# Patient Record
Sex: MALE | Race: WHITE
[De-identification: names, ages, dates, MRNs, and addresses within clinical notes are randomized per-mention and may not be internally consistent; named-entity substitution may affect disease eponyms.]

---

## 2019-07-14 ENCOUNTER — HOSPITAL ENCOUNTER (INPATIENT)
Dept: HOSPITAL 54 - ER | Age: 64
LOS: 11 days | Discharge: HOME | DRG: 885 | End: 2019-07-25
Attending: NURSE PRACTITIONER | Admitting: PSYCHIATRY & NEUROLOGY
Payer: MEDICAID

## 2019-07-14 VITALS — DIASTOLIC BLOOD PRESSURE: 71 MMHG | SYSTOLIC BLOOD PRESSURE: 121 MMHG

## 2019-07-14 VITALS — BODY MASS INDEX: 22.54 KG/M2 | WEIGHT: 161 LBS | HEIGHT: 71 IN

## 2019-07-14 VITALS — SYSTOLIC BLOOD PRESSURE: 121 MMHG | DIASTOLIC BLOOD PRESSURE: 71 MMHG

## 2019-07-14 DIAGNOSIS — E86.1: ICD-10-CM

## 2019-07-14 DIAGNOSIS — E03.9: ICD-10-CM

## 2019-07-14 DIAGNOSIS — F70: ICD-10-CM

## 2019-07-14 DIAGNOSIS — R62.50: ICD-10-CM

## 2019-07-14 DIAGNOSIS — H40.9: ICD-10-CM

## 2019-07-14 DIAGNOSIS — F29: Primary | ICD-10-CM

## 2019-07-14 DIAGNOSIS — F01.50: ICD-10-CM

## 2019-07-14 DIAGNOSIS — G93.41: ICD-10-CM

## 2019-07-14 DIAGNOSIS — D72.819: ICD-10-CM

## 2019-07-14 DIAGNOSIS — G40.909: ICD-10-CM

## 2019-07-14 DIAGNOSIS — E87.1: ICD-10-CM

## 2019-07-14 LAB
ALBUMIN SERPL BCP-MCNC: 3.8 G/DL (ref 3.4–5)
ALP SERPL-CCNC: 61 U/L (ref 46–116)
ALT SERPL W P-5'-P-CCNC: 26 U/L (ref 12–78)
APAP SERPL-MCNC: 0 UG/ML (ref 10–30)
AST SERPL W P-5'-P-CCNC: 17 U/L (ref 15–37)
BASOPHILS # BLD AUTO: 0 /CMM (ref 0–0.2)
BASOPHILS NFR BLD AUTO: 0.9 % (ref 0–2)
BILIRUB DIRECT SERPL-MCNC: 0.2 MG/DL (ref 0–0.2)
BILIRUB SERPL-MCNC: 0.6 MG/DL (ref 0.2–1)
BUN SERPL-MCNC: 15 MG/DL (ref 7–18)
CALCIUM SERPL-MCNC: 8.7 MG/DL (ref 8.5–10.1)
CHLORIDE SERPL-SCNC: 98 MMOL/L (ref 98–107)
CO2 SERPL-SCNC: 29 MMOL/L (ref 21–32)
CREAT SERPL-MCNC: 0.8 MG/DL (ref 0.6–1.3)
EOSINOPHIL NFR BLD AUTO: 3.5 % (ref 0–6)
ETHANOL SERPL-MCNC: < 3 MG/DL (ref 0–0)
GLUCOSE SERPL-MCNC: 88 MG/DL (ref 74–106)
HCT VFR BLD AUTO: 35 % (ref 39–51)
HGB BLD-MCNC: 12 G/DL (ref 13.5–17.5)
LYMPHOCYTES NFR BLD AUTO: 1.2 /CMM (ref 0.8–4.8)
LYMPHOCYTES NFR BLD AUTO: 37.9 % (ref 20–44)
MCHC RBC AUTO-ENTMCNC: 35 G/DL (ref 31–36)
MCV RBC AUTO: 96 FL (ref 80–96)
MONOCYTES NFR BLD AUTO: 0.5 /CMM (ref 0.1–1.3)
MONOCYTES NFR BLD AUTO: 15.1 % (ref 2–12)
NEUTROPHILS # BLD AUTO: 1.4 /CMM (ref 1.8–8.9)
NEUTROPHILS NFR BLD AUTO: 42.6 % (ref 43–81)
PH UR STRIP: 6 [PH] (ref 5–8)
PLATELET # BLD AUTO: 149 /CMM (ref 150–450)
POTASSIUM SERPL-SCNC: 3.9 MMOL/L (ref 3.5–5.1)
PROT SERPL-MCNC: 6.3 G/DL (ref 6.4–8.2)
RBC # BLD AUTO: 3.61 MIL/UL (ref 4.5–6)
SALICYLATES SERPL-MCNC: 2.4 MG/DL (ref 2.8–20)
SODIUM SERPL-SCNC: 132 MMOL/L (ref 136–145)
UROBILINOGEN UR STRIP-MCNC: 1 EU/DL
WBC NRBC COR # BLD AUTO: 3.3 K/UL (ref 4.3–11)

## 2019-07-14 PROCEDURE — G0480 DRUG TEST DEF 1-7 CLASSES: HCPCS

## 2019-07-14 RX ADMIN — LEVETIRACETAM SCH MG: 100 SOLUTION ORAL at 21:47

## 2019-07-14 RX ADMIN — VALPROIC ACID SCH MG: 250 SOLUTION ORAL at 21:53

## 2019-07-14 RX ADMIN — TEMAZEPAM PRN MG: 7.5 CAPSULE ORAL at 21:48

## 2019-07-14 NOTE — NUR
GPS RN ADMISSION NOTES



ADMITTED A 62 Y/O MALE FROM [facility] AND CARE  ON 5150 HOLD FOR PSYCHOSIS. PATIENT 
ARRIVED TO THE UNIT AT 1340 VIA WHEELCHAIR ACCOMPANIED BY 2 E.R. STAFF. PATIENT IS A/O X2-3. 
PATIENT APPARENTLY SENT TO HOSPITAL DUE TO DISORGANIZE, BIZARRE, ERRATIC BEHAVIOR, NON 
COMPLIANT WITH CARE AND MEDS. VITAL SIGNS TAKEN ,STABLE AND RECORDED. UPON FACE TO FACE 
INTERVIEW,  PT IS VERBALLY RESPONSIVE WITH GARBLE SPEECH, DENIES HI/SI. SAD, WELL GROOMED, 
UN-COOPERATIVE WITH DISORGANIZE THOUGHTS. NO C/O PAIN OR ANY DISCOMFORTS VOICED AT THIS 
TIME, SAME NO APPARENT DISTRESS NOTED. PHYSICAL ASSESSMENT DONE: SKIN IS INTACT. LUNGS CLEAR 
ON AUSCULTATION BILATERALLY. ABDOMEN SOFT, NON-TENDER WITH POSITIVE BOWEL SOUNDS ON FOUR 
QUADRANTS. BELONGINGS INVENTORIED AND CHECKED FOR CONTRABAND. PATIENT REFUSED/UN-ABLE TO 
SIGN FORM. PATIENT UNDER PSYCHIATRIC CARE OF DR. CHAVIRA AND MEDICAL CARE NP SHEKHAR. PATIENT 
HANDBOOK AND MEDICATION GUIDE GIVEN. PATIENT WISHES FULL CODE. SAFETY MEASURES INITIATED. 
BED PLACED IN LOWEST LOCK POSITION. WILL CONTINUE. TO MONITOR Q15 MINS FOR SAFETY AND 
BEHAVIOR

## 2019-07-14 NOTE — NUR
RECEIVED ORDER FOR ZYPREXA 5 MG PO. PT UNABLE TO TAKE MEDICATION ORAALLY. MD 
NOTIFIED AND ORDER TO GIVE MEDICATION IM INSTEAD OF PO

## 2019-07-15 VITALS — SYSTOLIC BLOOD PRESSURE: 144 MMHG | DIASTOLIC BLOOD PRESSURE: 69 MMHG

## 2019-07-15 VITALS — DIASTOLIC BLOOD PRESSURE: 62 MMHG | SYSTOLIC BLOOD PRESSURE: 111 MMHG

## 2019-07-15 VITALS — SYSTOLIC BLOOD PRESSURE: 129 MMHG | DIASTOLIC BLOOD PRESSURE: 52 MMHG

## 2019-07-15 LAB
ALBUMIN SERPL BCP-MCNC: 3.8 G/DL (ref 3.4–5)
ALP SERPL-CCNC: 59 U/L (ref 46–116)
ALT SERPL W P-5'-P-CCNC: 27 U/L (ref 12–78)
AST SERPL W P-5'-P-CCNC: 17 U/L (ref 15–37)
BILIRUB SERPL-MCNC: 0.5 MG/DL (ref 0.2–1)
BUN SERPL-MCNC: 15 MG/DL (ref 7–18)
CALCIUM SERPL-MCNC: 9 MG/DL (ref 8.5–10.1)
CHLORIDE SERPL-SCNC: 104 MMOL/L (ref 98–107)
CHOLEST SERPL-MCNC: 142 MG/DL (ref ?–200)
CO2 SERPL-SCNC: 28 MMOL/L (ref 21–32)
CREAT SERPL-MCNC: 0.9 MG/DL (ref 0.6–1.3)
GLUCOSE SERPL-MCNC: 79 MG/DL (ref 74–106)
HDLC SERPL-MCNC: 73 MG/DL (ref 40–60)
LDLC SERPL DIRECT ASSAY-MCNC: 53 MG/DL (ref 0–99)
POTASSIUM SERPL-SCNC: 4.3 MMOL/L (ref 3.5–5.1)
PROT SERPL-MCNC: 6.4 G/DL (ref 6.4–8.2)
SODIUM SERPL-SCNC: 140 MMOL/L (ref 136–145)
TRIGL SERPL-MCNC: 68 MG/DL (ref 30–150)
VALPROATE SERPL-MCNC: 56 UG/ML (ref 50–100)

## 2019-07-15 RX ADMIN — LACOSAMIDE SCH MG: 50 TABLET, FILM COATED ORAL at 21:46

## 2019-07-15 RX ADMIN — LEVETIRACETAM SCH MG: 100 SOLUTION ORAL at 09:04

## 2019-07-15 RX ADMIN — LORAZEPAM PRN MG: 0.5 TABLET ORAL at 09:04

## 2019-07-15 RX ADMIN — LEVOTHYROXINE SODIUM SCH MCG: 50 TABLET ORAL at 09:04

## 2019-07-15 RX ADMIN — VITAMIN D, TAB 1000IU (100/BT) SCH UNIT: 25 TAB at 09:04

## 2019-07-15 RX ADMIN — CARBAMAZEPINE SCH MG: 200 TABLET ORAL at 17:07

## 2019-07-15 RX ADMIN — LEVETIRACETAM SCH MG: 100 SOLUTION ORAL at 21:40

## 2019-07-15 RX ADMIN — LACOSAMIDE SCH MG: 50 TABLET, FILM COATED ORAL at 10:42

## 2019-07-15 RX ADMIN — TEMAZEPAM PRN MG: 7.5 CAPSULE ORAL at 01:33

## 2019-07-15 RX ADMIN — VALPROIC ACID SCH MG: 250 SOLUTION ORAL at 21:41

## 2019-07-15 RX ADMIN — CARBAMAZEPINE SCH MG: 200 TABLET ORAL at 09:06

## 2019-07-15 RX ADMIN — TEMAZEPAM PRN MG: 7.5 CAPSULE ORAL at 21:42

## 2019-07-15 NOTE — NUR
TEZ called [facility] and spoke to Dennis ([phone]) who stated that if the pt is 
stable then he can return to the facility. He stated that he would come and assess the pt 
closer to discharge.

## 2019-07-15 NOTE — NUR
TEZ called the pts brother, Dony ([phone]), and left a voicemail stating that the SW 
would like to speak to him about the pts treatment plan and discharge plan.

## 2019-07-16 VITALS — DIASTOLIC BLOOD PRESSURE: 75 MMHG | SYSTOLIC BLOOD PRESSURE: 162 MMHG

## 2019-07-16 VITALS — SYSTOLIC BLOOD PRESSURE: 119 MMHG | DIASTOLIC BLOOD PRESSURE: 76 MMHG

## 2019-07-16 VITALS — SYSTOLIC BLOOD PRESSURE: 161 MMHG | DIASTOLIC BLOOD PRESSURE: 103 MMHG

## 2019-07-16 RX ADMIN — LEVOTHYROXINE SODIUM SCH MCG: 50 TABLET ORAL at 08:33

## 2019-07-16 RX ADMIN — VALPROIC ACID SCH MG: 250 SOLUTION ORAL at 21:54

## 2019-07-16 RX ADMIN — LACOSAMIDE SCH MG: 50 TABLET, FILM COATED ORAL at 21:55

## 2019-07-16 RX ADMIN — CARBAMAZEPINE SCH MG: 200 TABLET ORAL at 08:33

## 2019-07-16 RX ADMIN — CARBAMAZEPINE SCH MG: 200 TABLET ORAL at 16:28

## 2019-07-16 RX ADMIN — LATANOPROST SCH ML: 50 SOLUTION OPHTHALMIC at 21:55

## 2019-07-16 RX ADMIN — VITAMIN D, TAB 1000IU (100/BT) SCH UNIT: 25 TAB at 08:33

## 2019-07-16 RX ADMIN — LEVETIRACETAM SCH MG: 100 SOLUTION ORAL at 09:00

## 2019-07-16 RX ADMIN — LORAZEPAM PRN MG: 0.5 TABLET ORAL at 21:54

## 2019-07-16 RX ADMIN — LEVETIRACETAM SCH MG: 100 SOLUTION ORAL at 21:54

## 2019-07-16 RX ADMIN — LACOSAMIDE SCH MG: 50 TABLET, FILM COATED ORAL at 08:34

## 2019-07-16 RX ADMIN — TEMAZEPAM PRN MG: 7.5 CAPSULE ORAL at 21:54

## 2019-07-16 NOTE — NUR
Dony ([phone]), pts brother, called the SW back and the SW conducted the remaining 
parts of the psychosocial assessment with him due to the pts inability to answer questions 
because of his disability. SW also discussed the pts treatment plan and initial discharge 
plan with the pt.

## 2019-07-16 NOTE — NUR
Initial Discharge Plan: Pt currently resides at [facility] located at [address] [place] [address]; ([phone]). Per pt's brother, Dony 
([phone]), the pt will have to be placed in a facility. SW will work with the pt, the 
pts brother, and the MD regarding appropriate discharge planning. SW will form a safe and 
proper discharge.

## 2019-07-17 VITALS — DIASTOLIC BLOOD PRESSURE: 79 MMHG | SYSTOLIC BLOOD PRESSURE: 118 MMHG

## 2019-07-17 VITALS — DIASTOLIC BLOOD PRESSURE: 81 MMHG | SYSTOLIC BLOOD PRESSURE: 120 MMHG

## 2019-07-17 VITALS — DIASTOLIC BLOOD PRESSURE: 67 MMHG | SYSTOLIC BLOOD PRESSURE: 112 MMHG

## 2019-07-17 LAB
BASOPHILS # BLD AUTO: 0 /CMM (ref 0–0.2)
BASOPHILS NFR BLD AUTO: 0.7 % (ref 0–2)
EOSINOPHIL NFR BLD AUTO: 6 % (ref 0–6)
HCT VFR BLD AUTO: 37 % (ref 39–51)
HGB BLD-MCNC: 12.9 G/DL (ref 13.5–17.5)
LYMPHOCYTES NFR BLD AUTO: 1.7 /CMM (ref 0.8–4.8)
LYMPHOCYTES NFR BLD AUTO: 38.2 % (ref 20–44)
MCHC RBC AUTO-ENTMCNC: 35 G/DL (ref 31–36)
MCV RBC AUTO: 96 FL (ref 80–96)
MONOCYTES NFR BLD AUTO: 0.5 /CMM (ref 0.1–1.3)
MONOCYTES NFR BLD AUTO: 10.8 % (ref 2–12)
NEUTROPHILS # BLD AUTO: 1.9 /CMM (ref 1.8–8.9)
NEUTROPHILS NFR BLD AUTO: 44.3 % (ref 43–81)
PLATELET # BLD AUTO: 150 /CMM (ref 150–450)
RBC # BLD AUTO: 3.86 MIL/UL (ref 4.5–6)
WBC NRBC COR # BLD AUTO: 4.4 K/UL (ref 4.3–11)

## 2019-07-17 RX ADMIN — VALPROIC ACID SCH MG: 250 SOLUTION ORAL at 21:32

## 2019-07-17 RX ADMIN — CARBAMAZEPINE SCH MG: 200 TABLET ORAL at 17:00

## 2019-07-17 RX ADMIN — LEVOTHYROXINE SODIUM SCH MCG: 50 TABLET ORAL at 07:30

## 2019-07-17 RX ADMIN — LACOSAMIDE SCH MG: 50 TABLET, FILM COATED ORAL at 21:31

## 2019-07-17 RX ADMIN — VITAMIN D, TAB 1000IU (100/BT) SCH UNIT: 25 TAB at 08:38

## 2019-07-17 RX ADMIN — LACOSAMIDE SCH MG: 50 TABLET, FILM COATED ORAL at 08:38

## 2019-07-17 RX ADMIN — LEVETIRACETAM SCH MG: 100 SOLUTION ORAL at 21:30

## 2019-07-17 RX ADMIN — CARBAMAZEPINE SCH MG: 200 TABLET ORAL at 08:37

## 2019-07-17 RX ADMIN — LATANOPROST SCH ML: 50 SOLUTION OPHTHALMIC at 21:32

## 2019-07-17 RX ADMIN — LEVETIRACETAM SCH MG: 100 SOLUTION ORAL at 08:37

## 2019-07-17 NOTE — NUR
GPS RN NOTES

RECEIVED ON BED SLEEPING,AROUSABLE TO VERBAL STIMULI,APPEARS ISOLATED.AMBULATE WITH STEADY 
GAIT.DENIES SI,WILL CONTINUE TO MONITOR BEHAVIOR

## 2019-07-17 NOTE — NUR
TEZ called Yanci ([phone]) from [facility] and inquired if the pt can be 
readmitted and she stated that they are at full capacity right now and therefore the pt 
cannot be accepted.

## 2019-07-18 VITALS — SYSTOLIC BLOOD PRESSURE: 126 MMHG | DIASTOLIC BLOOD PRESSURE: 72 MMHG

## 2019-07-18 VITALS — SYSTOLIC BLOOD PRESSURE: 119 MMHG | DIASTOLIC BLOOD PRESSURE: 77 MMHG

## 2019-07-18 VITALS — DIASTOLIC BLOOD PRESSURE: 66 MMHG | SYSTOLIC BLOOD PRESSURE: 120 MMHG

## 2019-07-18 RX ADMIN — LEVETIRACETAM SCH MG: 100 SOLUTION ORAL at 21:50

## 2019-07-18 RX ADMIN — LACOSAMIDE SCH MG: 50 TABLET, FILM COATED ORAL at 08:45

## 2019-07-18 RX ADMIN — LATANOPROST SCH ML: 50 SOLUTION OPHTHALMIC at 21:50

## 2019-07-18 RX ADMIN — VITAMIN D, TAB 1000IU (100/BT) SCH UNIT: 25 TAB at 08:45

## 2019-07-18 RX ADMIN — LEVOTHYROXINE SODIUM SCH MCG: 50 TABLET ORAL at 08:44

## 2019-07-18 RX ADMIN — VALPROIC ACID SCH MG: 250 SOLUTION ORAL at 21:51

## 2019-07-18 RX ADMIN — LACOSAMIDE SCH MG: 50 TABLET, FILM COATED ORAL at 21:50

## 2019-07-18 RX ADMIN — TEMAZEPAM PRN MG: 7.5 CAPSULE ORAL at 21:50

## 2019-07-18 RX ADMIN — LEVETIRACETAM SCH MG: 100 SOLUTION ORAL at 08:45

## 2019-07-18 RX ADMIN — CARBAMAZEPINE SCH MG: 200 TABLET ORAL at 08:47

## 2019-07-18 RX ADMIN — CARBAMAZEPINE SCH MG: 200 TABLET ORAL at 17:32

## 2019-07-18 NOTE — NUR
PC Hearing Notification: SW called the pts brother, Dony ([phone]), and left a 
voicemail stating that the pt will have a hearing today and informed him about what the 
hearing entails and the possible outcomes.

## 2019-07-18 NOTE — NUR
RN GPS NOTES



RECEIVED PATIENT ASLEEP IN BED ABLE TO AROUSE WITH VOICE AND TOUCH NO S/S OF DISTRESS OR 
ACUTE PAIN NOTED. PATIENT IS A/0 X1-2 .PATIENT DENIES ANY SUICIDE IDEATIONS OR HOMICIDAL 
IDEATIONS AT THIS TIME. PATIENT HAS NO NEEDS AT THIS TIME . SAFETY PRECAUTIONS IN PLACE BED 
IN LOW POSITION SIDE RAILS UP X2 FOR SAFETY, BED IN LOW LOCKED POSITION.COMPLIANT WITH 
MEDICATION  CALL  BELL WITHIN REACH WILL CONTINUE TO MONITOR ACCORDINGLY

## 2019-07-19 VITALS — SYSTOLIC BLOOD PRESSURE: 121 MMHG | DIASTOLIC BLOOD PRESSURE: 78 MMHG

## 2019-07-19 VITALS — SYSTOLIC BLOOD PRESSURE: 133 MMHG | DIASTOLIC BLOOD PRESSURE: 74 MMHG

## 2019-07-19 VITALS — SYSTOLIC BLOOD PRESSURE: 130 MMHG | DIASTOLIC BLOOD PRESSURE: 79 MMHG

## 2019-07-19 RX ADMIN — LACOSAMIDE SCH MG: 50 TABLET, FILM COATED ORAL at 08:43

## 2019-07-19 RX ADMIN — LEVOTHYROXINE SODIUM SCH MCG: 50 TABLET ORAL at 08:44

## 2019-07-19 RX ADMIN — LEVETIRACETAM SCH MG: 100 SOLUTION ORAL at 08:43

## 2019-07-19 RX ADMIN — LATANOPROST SCH ML: 50 SOLUTION OPHTHALMIC at 20:53

## 2019-07-19 RX ADMIN — VALPROIC ACID SCH MG: 250 SOLUTION ORAL at 20:53

## 2019-07-19 RX ADMIN — CARBAMAZEPINE SCH MG: 200 TABLET ORAL at 08:49

## 2019-07-19 RX ADMIN — VITAMIN D, TAB 1000IU (100/BT) SCH UNIT: 25 TAB at 08:43

## 2019-07-19 RX ADMIN — LACOSAMIDE SCH MG: 50 TABLET, FILM COATED ORAL at 20:52

## 2019-07-19 RX ADMIN — CARBAMAZEPINE SCH MG: 200 TABLET ORAL at 17:57

## 2019-07-19 RX ADMIN — LEVETIRACETAM SCH MG: 100 SOLUTION ORAL at 20:53

## 2019-07-19 RX ADMIN — TEMAZEPAM PRN MG: 7.5 CAPSULE ORAL at 20:53

## 2019-07-19 NOTE — NUR
TEZ called [facility] and spoke to Dennis ([phone]) and informed him that the pt 
will be discharged on Monday. He stated that he would like the pts transportation to be 
provided by the hospital and the SW stated that she will inform him about that on Monday.

## 2019-07-20 VITALS — SYSTOLIC BLOOD PRESSURE: 122 MMHG | DIASTOLIC BLOOD PRESSURE: 67 MMHG

## 2019-07-20 VITALS — SYSTOLIC BLOOD PRESSURE: 119 MMHG | DIASTOLIC BLOOD PRESSURE: 64 MMHG

## 2019-07-20 VITALS — DIASTOLIC BLOOD PRESSURE: 73 MMHG | SYSTOLIC BLOOD PRESSURE: 103 MMHG

## 2019-07-20 LAB
BASOPHILS # BLD AUTO: 0 /CMM (ref 0–0.2)
BASOPHILS NFR BLD AUTO: 0.7 % (ref 0–2)
BUN SERPL-MCNC: 18 MG/DL (ref 7–18)
CALCIUM SERPL-MCNC: 9 MG/DL (ref 8.5–10.1)
CHLORIDE SERPL-SCNC: 100 MMOL/L (ref 98–107)
CO2 SERPL-SCNC: 30 MMOL/L (ref 21–32)
CREAT SERPL-MCNC: 0.8 MG/DL (ref 0.6–1.3)
EOSINOPHIL NFR BLD AUTO: 6 % (ref 0–6)
GLUCOSE SERPL-MCNC: 81 MG/DL (ref 74–106)
HCT VFR BLD AUTO: 39 % (ref 39–51)
HGB BLD-MCNC: 13.3 G/DL (ref 13.5–17.5)
LYMPHOCYTES NFR BLD AUTO: 1.8 /CMM (ref 0.8–4.8)
LYMPHOCYTES NFR BLD AUTO: 42.4 % (ref 20–44)
MCHC RBC AUTO-ENTMCNC: 35 G/DL (ref 31–36)
MCV RBC AUTO: 96 FL (ref 80–96)
MONOCYTES NFR BLD AUTO: 0.5 /CMM (ref 0.1–1.3)
MONOCYTES NFR BLD AUTO: 10.7 % (ref 2–12)
NEUTROPHILS # BLD AUTO: 1.7 /CMM (ref 1.8–8.9)
NEUTROPHILS NFR BLD AUTO: 40.2 % (ref 43–81)
PLATELET # BLD AUTO: 146 /CMM (ref 150–450)
POTASSIUM SERPL-SCNC: 4.3 MMOL/L (ref 3.5–5.1)
RBC # BLD AUTO: 4.03 MIL/UL (ref 4.5–6)
SODIUM SERPL-SCNC: 138 MMOL/L (ref 136–145)
WBC NRBC COR # BLD AUTO: 4.3 K/UL (ref 4.3–11)

## 2019-07-20 RX ADMIN — VALPROIC ACID SCH MG: 250 SOLUTION ORAL at 21:22

## 2019-07-20 RX ADMIN — LACOSAMIDE SCH MG: 50 TABLET, FILM COATED ORAL at 21:21

## 2019-07-20 RX ADMIN — LEVOTHYROXINE SODIUM SCH MCG: 50 TABLET ORAL at 09:06

## 2019-07-20 RX ADMIN — LACOSAMIDE SCH MG: 50 TABLET, FILM COATED ORAL at 09:06

## 2019-07-20 RX ADMIN — CARBAMAZEPINE SCH MG: 200 TABLET ORAL at 09:07

## 2019-07-20 RX ADMIN — LEVETIRACETAM SCH MG: 100 SOLUTION ORAL at 21:22

## 2019-07-20 RX ADMIN — LATANOPROST SCH ML: 50 SOLUTION OPHTHALMIC at 21:22

## 2019-07-20 RX ADMIN — VITAMIN D, TAB 1000IU (100/BT) SCH UNIT: 25 TAB at 10:51

## 2019-07-20 RX ADMIN — TEMAZEPAM PRN MG: 7.5 CAPSULE ORAL at 21:21

## 2019-07-20 RX ADMIN — CARBAMAZEPINE SCH MG: 200 TABLET ORAL at 16:20

## 2019-07-20 RX ADMIN — LEVETIRACETAM SCH MG: 100 SOLUTION ORAL at 09:06

## 2019-07-21 VITALS — DIASTOLIC BLOOD PRESSURE: 73 MMHG | SYSTOLIC BLOOD PRESSURE: 118 MMHG

## 2019-07-21 VITALS — SYSTOLIC BLOOD PRESSURE: 112 MMHG | DIASTOLIC BLOOD PRESSURE: 67 MMHG

## 2019-07-21 VITALS — SYSTOLIC BLOOD PRESSURE: 99 MMHG | DIASTOLIC BLOOD PRESSURE: 64 MMHG

## 2019-07-21 RX ADMIN — VALPROIC ACID SCH MG: 250 SOLUTION ORAL at 21:38

## 2019-07-21 RX ADMIN — VITAMIN D, TAB 1000IU (100/BT) SCH UNIT: 25 TAB at 08:21

## 2019-07-21 RX ADMIN — CARBAMAZEPINE SCH MG: 200 TABLET ORAL at 08:22

## 2019-07-21 RX ADMIN — LACOSAMIDE SCH MG: 50 TABLET, FILM COATED ORAL at 08:21

## 2019-07-21 RX ADMIN — LATANOPROST SCH ML: 50 SOLUTION OPHTHALMIC at 21:39

## 2019-07-21 RX ADMIN — LACOSAMIDE SCH MG: 50 TABLET, FILM COATED ORAL at 21:39

## 2019-07-21 RX ADMIN — CARBAMAZEPINE SCH MG: 200 TABLET ORAL at 16:39

## 2019-07-21 RX ADMIN — LEVETIRACETAM SCH MG: 100 SOLUTION ORAL at 08:22

## 2019-07-21 RX ADMIN — LEVOTHYROXINE SODIUM SCH MCG: 50 TABLET ORAL at 08:21

## 2019-07-21 RX ADMIN — LEVETIRACETAM SCH MG: 100 SOLUTION ORAL at 21:37

## 2019-07-21 NOTE — NUR
GPS RN NOTE, RECEIVED PATIENT AWAKE AND IN BED, NO S/S OR COMPLAINTS OF PAIN AT THIS TIME.  
PATIENT IS DISPLAYING NO S/S OF APPARENT DISTRESS AT THIS TIME.  PATIENT BREATHING IS 
UNLABORED WITH EQUAL RISE AND FALL OF THE CHEST.  PATIENT IS ALERT AND ORIENTED X 2 ON ROOM 
AIR WITH A SPO2 OF 99 %.  PATIENT IS MED COMPLAINT, DISORGANIZED, ANXIOUS, COOPERATIVE, AND 
NEEDS REORIENTATION.  PATIENT SKIN IS INTACT.  PATIENT DENIES SUICIDE AND HOMICIDAL 
IDEATIONS AT THIS TIME.  PATIENT ASSISTED WITH TURNING AND REPOSITIONING Q2HR AND PRN FOR 
COMFORT AND CIRCULATION.  PATIENT HAS NO NEEDS AT THIS TIME.  PATIENT EDUCATED ON THE USE OF 
THE CALL BELL.  PATIENT BED SIDE RAILS ARE UP X 2 FOR SAFETY, BED IS LOCKED, AND LOW WILL 
CONTINUE TO MONITOR AND MAINTAIN SAFETY.

## 2019-07-22 VITALS — SYSTOLIC BLOOD PRESSURE: 122 MMHG | DIASTOLIC BLOOD PRESSURE: 79 MMHG

## 2019-07-22 VITALS — SYSTOLIC BLOOD PRESSURE: 169 MMHG | DIASTOLIC BLOOD PRESSURE: 90 MMHG

## 2019-07-22 VITALS — DIASTOLIC BLOOD PRESSURE: 72 MMHG | SYSTOLIC BLOOD PRESSURE: 119 MMHG

## 2019-07-22 VITALS — DIASTOLIC BLOOD PRESSURE: 79 MMHG | SYSTOLIC BLOOD PRESSURE: 128 MMHG

## 2019-07-22 RX ADMIN — VITAMIN D, TAB 1000IU (100/BT) SCH UNIT: 25 TAB at 08:58

## 2019-07-22 RX ADMIN — LACOSAMIDE SCH MG: 50 TABLET, FILM COATED ORAL at 22:34

## 2019-07-22 RX ADMIN — LACOSAMIDE SCH MG: 50 TABLET, FILM COATED ORAL at 08:59

## 2019-07-22 RX ADMIN — LEVETIRACETAM SCH MG: 100 SOLUTION ORAL at 22:34

## 2019-07-22 RX ADMIN — CARBAMAZEPINE SCH MG: 200 TABLET ORAL at 08:58

## 2019-07-22 RX ADMIN — LATANOPROST SCH DROP: 50 SOLUTION OPHTHALMIC at 22:35

## 2019-07-22 RX ADMIN — LEVOTHYROXINE SODIUM SCH MCG: 50 TABLET ORAL at 07:30

## 2019-07-22 RX ADMIN — LORAZEPAM PRN MG: 0.5 TABLET ORAL at 08:59

## 2019-07-22 RX ADMIN — LEVETIRACETAM SCH MG: 100 SOLUTION ORAL at 08:58

## 2019-07-22 RX ADMIN — CARBAMAZEPINE SCH MG: 200 TABLET ORAL at 17:00

## 2019-07-22 NOTE — NUR
RN NOTE:AT 0929 PATIENT HAD SEIZURE ,RAPID RESPONSE CALLED  /90,P 53,O2 SAT 88%

T 98.7 ,BLOOD GLUCOSE 91,RAPID RESPONSE TEAM HOUSE SUPERVISOR ,RT ,ICU NURSE .AT 09:55 BP 
124/90, P 98,O2 SAT 94%,BLOOD GLUCOSE 91 .CALLED  AT 0930 ,PATIENT SEEN BY 
 AT 10:30 .WILL CONTINUE TO MONITOR.

## 2019-07-23 VITALS — SYSTOLIC BLOOD PRESSURE: 120 MMHG | DIASTOLIC BLOOD PRESSURE: 69 MMHG

## 2019-07-23 VITALS — DIASTOLIC BLOOD PRESSURE: 64 MMHG | SYSTOLIC BLOOD PRESSURE: 100 MMHG

## 2019-07-23 VITALS — DIASTOLIC BLOOD PRESSURE: 75 MMHG | SYSTOLIC BLOOD PRESSURE: 132 MMHG

## 2019-07-23 LAB
BASOPHILS # BLD AUTO: 0 /CMM (ref 0–0.2)
BASOPHILS NFR BLD AUTO: 0.4 % (ref 0–2)
CARBAMAZEPINE SERPL-MCNC: 5.5 UG/ML (ref 4–11.9)
EOSINOPHIL NFR BLD AUTO: 3.6 % (ref 0–6)
HCT VFR BLD AUTO: 37 % (ref 39–51)
HGB BLD-MCNC: 12.6 G/DL (ref 13.5–17.5)
LYMPHOCYTES NFR BLD AUTO: 2 /CMM (ref 0.8–4.8)
LYMPHOCYTES NFR BLD AUTO: 36.4 % (ref 20–44)
MCHC RBC AUTO-ENTMCNC: 34 G/DL (ref 31–36)
MCV RBC AUTO: 96 FL (ref 80–96)
MONOCYTES NFR BLD AUTO: 0.6 /CMM (ref 0.1–1.3)
MONOCYTES NFR BLD AUTO: 11.3 % (ref 2–12)
NEUTROPHILS # BLD AUTO: 2.6 /CMM (ref 1.8–8.9)
NEUTROPHILS NFR BLD AUTO: 48.3 % (ref 43–81)
PLATELET # BLD AUTO: 150 /CMM (ref 150–450)
RBC # BLD AUTO: 3.83 MIL/UL (ref 4.5–6)
VALPROATE SERPL-MCNC: 67 UG/ML (ref 50–100)
WBC NRBC COR # BLD AUTO: 5.4 K/UL (ref 4.3–11)

## 2019-07-23 RX ADMIN — LACOSAMIDE SCH MG: 50 TABLET, FILM COATED ORAL at 21:27

## 2019-07-23 RX ADMIN — LATANOPROST SCH DROP: 50 SOLUTION OPHTHALMIC at 21:29

## 2019-07-23 RX ADMIN — VALPROIC ACID SCH MG: 250 SOLUTION ORAL at 21:27

## 2019-07-23 RX ADMIN — LEVOTHYROXINE SODIUM SCH MCG: 50 TABLET ORAL at 08:15

## 2019-07-23 RX ADMIN — VALPROIC ACID SCH MG: 250 SOLUTION ORAL at 11:58

## 2019-07-23 RX ADMIN — LEVETIRACETAM SCH MG: 100 SOLUTION ORAL at 08:14

## 2019-07-23 RX ADMIN — LACOSAMIDE SCH MG: 50 TABLET, FILM COATED ORAL at 08:15

## 2019-07-23 RX ADMIN — VITAMIN D, TAB 1000IU (100/BT) SCH UNIT: 25 TAB at 08:15

## 2019-07-23 RX ADMIN — LEVETIRACETAM SCH MG: 100 SOLUTION ORAL at 21:27

## 2019-07-23 RX ADMIN — CARBAMAZEPINE SCH MG: 200 TABLET ORAL at 17:04

## 2019-07-23 RX ADMIN — CARBAMAZEPINE SCH MG: 200 TABLET ORAL at 08:15

## 2019-07-23 NOTE — NUR
RN DISCHARGE NOTE: PATIENT IS AN 88 YEAR OLD MALE DISCHARGED TO [facility] LOCATED AT [address] [facility] [address] [phone]. PATIENT IS IN STABLE 
CONDITION AT THE TIME OF DISCHARGE. SELECTIVE COMPLIANCE WITH MEDICATION MANAGEMENT. 
COOPERATIVE WITH PLAN OF CARE. VSS. NO ACUTE DISTRESS NOTED. ATTENDED TO NEEDS. DENIES SI/HI 
VAH AT THE TIME OF DISCHARGE. BEHAVIOR IMPROVED. PSYCHIATRIC TREATMENT PLANS MET. MEDICAL 
TREATMENT PLANS DEFERRED FOR CONTINUAL MONITORING. EDUCATED PATIENT ABOUT AFTERCARE AND 
PROVIDED COPY. MEDICATIONS RECONCILED WITH YA CALVIN AND DR GUNDERSON WITH ORDERS TO DC 
PSYCHIATRIC HOLD. WOUND PICTURES TAKEN AND DOCUMENTED IN THE CHART. REPORT WAS GIVEN TO 
FACILITY. PATIENT REFUSED TO SIGN DISCHARGE PAPERWORK. RETURNED PERSONAL BELONGINGS TO 
PATIENT. CONTACTED DR FLORENTINO AND INFORMED HIM ABOUT THE EKG RESULTS WITH NNO. FOR FOLLOW UP 
WITH PSYCHIATRIST DR HARO LOCATED AT [address] [facility] [address] [phone] AND INTERNIST DR BENTON LOCATED AT [address] [facility] [address]. 
PATIENT LEFT [facility] GPS UNIT VIA [facility] AT 1675.

## 2019-07-23 NOTE — NUR
GPS RN NOTE, RECEIVED PATIENT AWAKE AND IN BED, NO S/S OR COMPLAINTS OF PAIN AT THIS TIME.  
PATIENT IS DISPLAYING NO S/S OF APPARENT DISTRESS AT THIS TIME.  PATIENT BREATHING IS 
UNLABORED WITH EQUAL RISE AND FALL OF THE CHEST.  PATIENT IS ALERT AND ORIENTED X 2 ON ROOM 
AIR WITH A SPO2 OF 99 %.  PATIENT IS MED COMPLAINT, DISORGANIZED, ANXIOUS, COOPERATIVE, AND 
NEEDS REORIENTATION.  PATIENT DENIES SUICIDE AND HOMICIDAL IDEATIONS AT THIS TIME.  PATIENT 
ASSISTED WITH TURNING AND REPOSITIONING Q2HR AND PRN FOR COMFORT AND CIRCULATION.  PATIENT 
HAS NO NEEDS AT THIS TIME.  PATIENT EDUCATED ON THE USE OF THE CALL BELL.  PATIENT BED SIDE 
RAILS ARE UP X 2 FOR SAFETY, BED IS LOCKED, AND LOW WILL CONTINUE TO MONITOR AND MAINTAIN 
SAFETY.

## 2019-07-24 VITALS — DIASTOLIC BLOOD PRESSURE: 68 MMHG | SYSTOLIC BLOOD PRESSURE: 124 MMHG

## 2019-07-24 VITALS — DIASTOLIC BLOOD PRESSURE: 84 MMHG | SYSTOLIC BLOOD PRESSURE: 115 MMHG

## 2019-07-24 VITALS — SYSTOLIC BLOOD PRESSURE: 132 MMHG | DIASTOLIC BLOOD PRESSURE: 70 MMHG

## 2019-07-24 RX ADMIN — LEVOTHYROXINE SODIUM SCH MCG: 50 TABLET ORAL at 08:03

## 2019-07-24 RX ADMIN — CARBAMAZEPINE SCH MG: 200 TABLET ORAL at 16:50

## 2019-07-24 RX ADMIN — VITAMIN D, TAB 1000IU (100/BT) SCH UNIT: 25 TAB at 08:03

## 2019-07-24 RX ADMIN — LACOSAMIDE SCH MG: 50 TABLET, FILM COATED ORAL at 20:11

## 2019-07-24 RX ADMIN — VALPROIC ACID SCH MG: 250 SOLUTION ORAL at 08:02

## 2019-07-24 RX ADMIN — CARBAMAZEPINE SCH MG: 200 TABLET ORAL at 08:03

## 2019-07-24 RX ADMIN — VALPROIC ACID SCH MG: 250 SOLUTION ORAL at 20:10

## 2019-07-24 RX ADMIN — LACOSAMIDE SCH MG: 50 TABLET, FILM COATED ORAL at 08:03

## 2019-07-24 RX ADMIN — LATANOPROST SCH DROP: 50 SOLUTION OPHTHALMIC at 21:21

## 2019-07-24 RX ADMIN — LEVETIRACETAM SCH MG: 100 SOLUTION ORAL at 20:11

## 2019-07-24 RX ADMIN — LEVETIRACETAM SCH MG: 100 SOLUTION ORAL at 08:02

## 2019-07-24 NOTE — NUR
TEZ called the pts brother, Dony ([phone]), and informed him that the pt was not 
discharged on Monday and that this was because the pt was reacting to his medications and 
changes needed to be made. TEZ stated that she would inform him once there is a discharge 
date.

## 2019-07-24 NOTE — NUR
TEZ called [facility] ([phone]) and left a voicemail for Dennis stating that the 
pt has not been discharged yet due to his medications and that the SW will inform him when 
that will occur.

## 2019-07-25 VITALS — SYSTOLIC BLOOD PRESSURE: 120 MMHG | DIASTOLIC BLOOD PRESSURE: 65 MMHG

## 2019-07-25 VITALS — DIASTOLIC BLOOD PRESSURE: 70 MMHG | SYSTOLIC BLOOD PRESSURE: 109 MMHG

## 2019-07-25 RX ADMIN — LACOSAMIDE SCH MG: 50 TABLET, FILM COATED ORAL at 08:14

## 2019-07-25 RX ADMIN — VITAMIN D, TAB 1000IU (100/BT) SCH UNIT: 25 TAB at 08:13

## 2019-07-25 RX ADMIN — LEVETIRACETAM SCH MG: 100 SOLUTION ORAL at 08:11

## 2019-07-25 RX ADMIN — CARBAMAZEPINE SCH MG: 200 TABLET ORAL at 08:15

## 2019-07-25 RX ADMIN — VALPROIC ACID SCH MG: 250 SOLUTION ORAL at 08:12

## 2019-07-25 RX ADMIN — LEVOTHYROXINE SODIUM SCH MCG: 50 TABLET ORAL at 08:15

## 2019-07-25 RX ADMIN — CARBAMAZEPINE SCH MG: 200 TABLET ORAL at 17:04

## 2019-07-25 NOTE — NUR
RN DISCHARGE NOTE: CARLOS 

PATIENT IS A 63 YEAR OLD MALE THAT WAS DISCHARGED TO [facility] ([facility] [place] [address] [facility] [phone]. 

PATIENT IS IN STABLE CONIDITION. COMPLIANT WITH MEDICATION MANAGEMENT. COOPERATIVE WITH PLAN 
OF CARE. VSS. NO ACUTE DISTRESS NOTED. NO COMPLAINTS. DENIES SI/HI VAH AT THE TIME OF 
DISCHARGE. BEHAVIOR IMPROVED. PSYCHIATRIC TREATMENT PLANS MET. MEDICAL TREATMENT PLANS 
DEFERRED FOR CONTINUAL MONITORING. EDUCATED PATIENT ABOUT AFTERCARE WITH COPY PROVIDED. 
RETURNED PERSONAL BELONGINGS TO PATIENT. DISCHARGE PAPERWORK EXPLAINED AND SIGNED. SKIN 
INTACT. 

MEDICATIONS RECONCILED WITH YA BLACK AND DR CHAVIRA WITH PSYCHIATRIC DISCHARGE ORDERS.

FOR FOLLOW UP WITH PSYCHIATRIST IN [facility] LOCATED AT [address] [facility] [address] [phone] AND INTERNIST DR DIONTE ALEJANDRO LOCATED AT [address] [facility] [phone] IN 1 WEEK. 



PATIENT LEFT [facility] GPS UNIT VIA TAXI AT 1800 . PATIENT WAS ESCORTED DOWN.

## 2019-07-25 NOTE — NUR
TEZ called the pts brother, Dony ([phone]), and informed him via voicemail message 
that the pt will be discharged back to [facility] today.

## 2019-07-25 NOTE — NUR
RN-CO: DR CHAVIRA SEEN AND EXAMINED THE PATIENT WITH ORDERS TO DISCHARGE PATIENT AND 
DISCONTINUE HOLD. NOTED.

## 2019-07-25 NOTE — NUR
Discharge Note: Pt was discharged to [facility] (intermediate care facility for 
developmentally disabled) located at [address] [place] [address]; 
([phone]). Pt was transported via taxi around 4pm. Fax of records was sent to: 
[phone]. Upon discharge, the pt appeared to be in a euthymic mood and presented with a 
distressed affect. Pt denied both suicidal and homicidal ideation as well as auditory and 
visual hallucinations. Pt will seek psychiatric services at [facility] 
located at [address] [place] [address]; [phone]. Pt will be under the 
care of his internist, Dr. Shanna Cuadra, located at [address] [place] [address]; 
[phone].

## 2019-07-25 NOTE — NUR
TEZ called [facility] and spoke to Dennis ([phone]) and informed him that the pt 
is being discharged today and that he will be arriving via taxi. He stated that he would 
prepare the pts admission.

## 2019-08-30 ENCOUNTER — HOSPITAL ENCOUNTER (EMERGENCY)
Dept: HOSPITAL 54 - ER | Age: 64
LOS: 1 days | Discharge: HOME | End: 2019-08-31
Payer: MEDICARE

## 2019-08-30 VITALS — WEIGHT: 170 LBS | BODY MASS INDEX: 24.34 KG/M2 | HEIGHT: 70 IN

## 2019-08-30 DIAGNOSIS — G40.909: ICD-10-CM
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
## 2019-08-30 DIAGNOSIS — Z79.899: ICD-10-CM

## 2019-08-30 DIAGNOSIS — T81.30XA: ICD-10-CM

## 2019-08-30 DIAGNOSIS — S01.01XA: Primary | ICD-10-CM
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
## 2019-08-30 LAB
BASOPHILS # BLD AUTO: 0 /CMM (ref 0–0.2)
BASOPHILS NFR BLD AUTO: 0.6 % (ref 0–2)
BUN SERPL-MCNC: 19 MG/DL (ref 7–18)
CALCIUM SERPL-MCNC: 9.3 MG/DL (ref 8.5–10.1)
CHLORIDE SERPL-SCNC: 102 MMOL/L (ref 98–107)
CO2 SERPL-SCNC: 25 MMOL/L (ref 21–32)
CREAT SERPL-MCNC: 1.1 MG/DL (ref 0.6–1.3)
EOSINOPHIL NFR BLD AUTO: 1.3 % (ref 0–6)
GLUCOSE SERPL-MCNC: 101 MG/DL (ref 74–106)
HCT VFR BLD AUTO: 34 % (ref 39–51)
HGB BLD-MCNC: 11.7 G/DL (ref 13.5–17.5)
LYMPHOCYTES NFR BLD AUTO: 1.9 /CMM (ref 0.8–4.8)
LYMPHOCYTES NFR BLD AUTO: 25.7 % (ref 20–44)
MCHC RBC AUTO-ENTMCNC: 34 G/DL (ref 31–36)
MCV RBC AUTO: 98 FL (ref 80–96)
MONOCYTES NFR BLD AUTO: 0.9 /CMM (ref 0.1–1.3)
MONOCYTES NFR BLD AUTO: 11.5 % (ref 2–12)
NEUTROPHILS # BLD AUTO: 4.6 /CMM (ref 1.8–8.9)
NEUTROPHILS NFR BLD AUTO: 60.9 % (ref 43–81)
PLATELET # BLD AUTO: 179 /CMM (ref 150–450)
POTASSIUM SERPL-SCNC: 4.3 MMOL/L (ref 3.5–5.1)
RBC # BLD AUTO: 3.51 MIL/UL (ref 4.5–6)
SODIUM SERPL-SCNC: 140 MMOL/L (ref 136–145)
WBC NRBC COR # BLD AUTO: 7.5 K/UL (ref 4.3–11)

## 2019-08-30 PROCEDURE — 36415 COLL VENOUS BLD VENIPUNCTURE: CPT

## 2019-08-30 PROCEDURE — 96365 THER/PROPH/DIAG IV INF INIT: CPT

## 2019-08-30 PROCEDURE — 12020 TX SUPFC WND DEHSN SMPL CLSR: CPT

## 2019-08-30 PROCEDURE — 70450 CT HEAD/BRAIN W/O DYE: CPT

## 2019-08-30 PROCEDURE — 85025 COMPLETE CBC W/AUTO DIFF WBC: CPT

## 2019-08-30 PROCEDURE — 99284 EMERGENCY DEPT VISIT MOD MDM: CPT

## 2019-08-30 PROCEDURE — 12001 RPR S/N/AX/GEN/TRNK 2.5CM/<: CPT

## 2019-08-30 PROCEDURE — 80048 BASIC METABOLIC PNL TOTAL CA: CPT

## 2019-08-30 PROCEDURE — 93005 ELECTROCARDIOGRAM TRACING: CPT

## 2019-08-30 PROCEDURE — 80156 ASSAY CARBAMAZEPINE TOTAL: CPT

## 2019-08-30 PROCEDURE — 96375 TX/PRO/DX INJ NEW DRUG ADDON: CPT

## 2019-08-30 PROCEDURE — 87081 CULTURE SCREEN ONLY: CPT

## 2019-08-30 NOTE — NUR
PT BIBRA FROM [facility] C/O WITNESSED SEIZURE, +HEAD LACERATION, PT IS AAOX1, 
NOT IN RESPIRATORY DISTRESS, NOTED RESTLESSNESS, HOOKED TO MONITOR, KEPT RESTED 
AND COMFORATBLE, WILL CONTINUE TO MONITOR.

## 2019-08-31 VITALS — SYSTOLIC BLOOD PRESSURE: 127 MMHG | DIASTOLIC BLOOD PRESSURE: 83 MMHG

## 2019-08-31 NOTE — NUR
SPOKE WITH NITESH FROM [facility] AND INFORMED PT WILL BE RETURNING TO 
FACILTY

## 2019-09-09 ENCOUNTER — HOSPITAL ENCOUNTER (EMERGENCY)
Dept: HOSPITAL 54 - ER | Age: 64
Discharge: HOME | End: 2019-09-09
Payer: MEDICARE

## 2019-09-09 VITALS — WEIGHT: 170 LBS | HEIGHT: 70 IN | BODY MASS INDEX: 24.34 KG/M2

## 2019-09-09 VITALS — DIASTOLIC BLOOD PRESSURE: 88 MMHG | SYSTOLIC BLOOD PRESSURE: 136 MMHG
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
## 2019-09-09 DIAGNOSIS — G40.909: ICD-10-CM

## 2019-09-09 DIAGNOSIS — Z79.899: ICD-10-CM

## 2019-09-09 DIAGNOSIS — F84.0: ICD-10-CM

## 2019-09-09 DIAGNOSIS — S01.01XD: Primary | ICD-10-CM

## 2019-11-11 ENCOUNTER — HOSPITAL ENCOUNTER (INPATIENT)
Dept: HOSPITAL 54 - ER | Age: 64
LOS: 10 days | Discharge: HOME | DRG: 885 | End: 2019-11-21
Attending: INTERNAL MEDICINE | Admitting: PSYCHIATRY & NEUROLOGY
Payer: MEDICARE

## 2019-11-11 VITALS — BODY MASS INDEX: 24.62 KG/M2 | WEIGHT: 172 LBS | HEIGHT: 70 IN

## 2019-11-11 DIAGNOSIS — H40.9: ICD-10-CM

## 2019-11-11 DIAGNOSIS — R31.9: ICD-10-CM

## 2019-11-11 DIAGNOSIS — R56.9: ICD-10-CM

## 2019-11-11 DIAGNOSIS — Z91.14: ICD-10-CM

## 2019-11-11 DIAGNOSIS — F41.9: ICD-10-CM

## 2019-11-11 DIAGNOSIS — B96.89: ICD-10-CM

## 2019-11-11 DIAGNOSIS — E87.1: ICD-10-CM

## 2019-11-11 DIAGNOSIS — N39.0: ICD-10-CM

## 2019-11-11 DIAGNOSIS — D69.6: ICD-10-CM

## 2019-11-11 DIAGNOSIS — F29: Primary | ICD-10-CM

## 2019-11-11 DIAGNOSIS — Z79.899: ICD-10-CM

## 2019-11-11 DIAGNOSIS — D72.829: ICD-10-CM

## 2019-11-11 DIAGNOSIS — E03.9: ICD-10-CM

## 2019-11-11 DIAGNOSIS — G92: ICD-10-CM

## 2019-11-11 DIAGNOSIS — D64.9: ICD-10-CM

## 2019-11-11 DIAGNOSIS — F01.50: ICD-10-CM

## 2019-11-11 DIAGNOSIS — F20.9: ICD-10-CM

## 2019-11-11 DIAGNOSIS — F84.0: ICD-10-CM

## 2019-11-11 DIAGNOSIS — E86.0: ICD-10-CM

## 2019-11-11 LAB
ALBUMIN SERPL BCP-MCNC: 3.7 G/DL (ref 3.4–5)
ALP SERPL-CCNC: 59 U/L (ref 46–116)
ALT SERPL W P-5'-P-CCNC: 21 U/L (ref 12–78)
APAP SERPL-MCNC: < 2 UG/ML (ref 10–30)
AST SERPL W P-5'-P-CCNC: 18 U/L (ref 15–37)
BASOPHILS # BLD AUTO: 0.1 /CMM (ref 0–0.2)
BASOPHILS NFR BLD AUTO: 0.5 % (ref 0–2)
BILIRUB DIRECT SERPL-MCNC: 0.2 MG/DL (ref 0–0.2)
BILIRUB SERPL-MCNC: 0.6 MG/DL (ref 0.2–1)
BILIRUB UR QL STRIP: (no result)
BUN SERPL-MCNC: 28 MG/DL (ref 7–18)
CALCIUM SERPL-MCNC: 9.5 MG/DL (ref 8.5–10.1)
CHLORIDE SERPL-SCNC: 104 MMOL/L (ref 98–107)
CO2 SERPL-SCNC: 28 MMOL/L (ref 21–32)
COLOR UR: (no result)
CREAT SERPL-MCNC: 1 MG/DL (ref 0.6–1.3)
EOSINOPHIL NFR BLD AUTO: 1 % (ref 0–6)
ETHANOL SERPL-MCNC: < 3 MG/DL (ref 0–0)
GLUCOSE SERPL-MCNC: 86 MG/DL (ref 74–106)
HCT VFR BLD AUTO: 36 % (ref 39–51)
HGB BLD-MCNC: 12.1 G/DL (ref 13.5–17.5)
KETONES UR STRIP-MCNC: 40 MG/DL
LYMPHOCYTES NFR BLD AUTO: 1.7 /CMM (ref 0.8–4.8)
LYMPHOCYTES NFR BLD AUTO: 14.5 % (ref 20–44)
MCHC RBC AUTO-ENTMCNC: 33 G/DL (ref 31–36)
MCV RBC AUTO: 96 FL (ref 80–96)
MONOCYTES NFR BLD AUTO: 1.6 /CMM (ref 0.1–1.3)
MONOCYTES NFR BLD AUTO: 13.9 % (ref 2–12)
NEUTROPHILS # BLD AUTO: 8.2 /CMM (ref 1.8–8.9)
NEUTROPHILS NFR BLD AUTO: 70.1 % (ref 43–81)
PH UR STRIP: 5.5 [PH] (ref 5–8)
PLATELET # BLD AUTO: 139 /CMM (ref 150–450)
POTASSIUM SERPL-SCNC: 4.9 MMOL/L (ref 3.5–5.1)
PROT SERPL-MCNC: 7.1 G/DL (ref 6.4–8.2)
PROT UR QL STRIP: 30 MG/DL
RBC # BLD AUTO: 3.78 MIL/UL (ref 4.5–6)
RBC #/AREA URNS HPF: (no result) /HPF (ref 0–2)
SALICYLATES SERPL-MCNC: 1 MG/DL (ref 2.8–20)
SODIUM SERPL-SCNC: 138 MMOL/L (ref 136–145)
UROBILINOGEN UR STRIP-MCNC: 1 EU/DL
WBC #/AREA URNS HPF: (no result) /HPF (ref 0–3)
WBC NRBC COR # BLD AUTO: 11.8 K/UL (ref 4.3–11)

## 2019-11-11 PROCEDURE — G0480 DRUG TEST DEF 1-7 CLASSES: HCPCS

## 2019-11-12 VITALS — SYSTOLIC BLOOD PRESSURE: 128 MMHG | DIASTOLIC BLOOD PRESSURE: 67 MMHG

## 2019-11-12 VITALS — DIASTOLIC BLOOD PRESSURE: 88 MMHG | SYSTOLIC BLOOD PRESSURE: 153 MMHG

## 2019-11-12 VITALS — DIASTOLIC BLOOD PRESSURE: 86 MMHG | SYSTOLIC BLOOD PRESSURE: 119 MMHG

## 2019-11-12 VITALS — SYSTOLIC BLOOD PRESSURE: 104 MMHG | DIASTOLIC BLOOD PRESSURE: 72 MMHG

## 2019-11-12 RX ADMIN — Medication SCH OZ: at 12:53

## 2019-11-12 RX ADMIN — TEMAZEPAM PRN MG: 7.5 CAPSULE ORAL at 22:00

## 2019-11-12 RX ADMIN — DIVALPROEX SODIUM SCH MG: 500 TABLET, DELAYED RELEASE ORAL at 08:30

## 2019-11-12 RX ADMIN — RISPERIDONE SCH MG: 0.5 TABLET, ORALLY DISINTEGRATING ORAL at 09:30

## 2019-11-12 RX ADMIN — DIVALPROEX SODIUM SCH MG: 500 TABLET, DELAYED RELEASE ORAL at 16:58

## 2019-11-12 RX ADMIN — CARBAMAZEPINE SCH MG: 200 TABLET ORAL at 16:59

## 2019-11-12 RX ADMIN — RISPERIDONE SCH MG: 0.5 TABLET, ORALLY DISINTEGRATING ORAL at 12:19

## 2019-11-12 RX ADMIN — BENZTROPINE MESYLATE SCH MG: 1 TABLET ORAL at 22:00

## 2019-11-12 RX ADMIN — CARBAMAZEPINE SCH MG: 200 TABLET ORAL at 12:18

## 2019-11-12 RX ADMIN — VITAMIN D, TAB 1000IU (100/BT) SCH UNIT: 25 TAB at 08:31

## 2019-11-12 RX ADMIN — LACOSAMIDE SCH MG: 50 TABLET, FILM COATED ORAL at 22:00

## 2019-11-12 RX ADMIN — LEVOTHYROXINE SODIUM SCH MCG: 50 TABLET ORAL at 07:30

## 2019-11-12 RX ADMIN — LATANOPROST SCH ML: 50 SOLUTION OPHTHALMIC at 18:00

## 2019-11-12 RX ADMIN — RISPERIDONE SCH MG: 0.5 TABLET, ORALLY DISINTEGRATING ORAL at 17:00

## 2019-11-12 RX ADMIN — Medication SCH GM: at 12:54

## 2019-11-12 RX ADMIN — CARBAMAZEPINE SCH MG: 200 TABLET ORAL at 08:30

## 2019-11-12 RX ADMIN — LACOSAMIDE SCH MG: 50 TABLET, FILM COATED ORAL at 08:31

## 2019-11-12 RX ADMIN — CEPHALEXIN SCH MG: 500 CAPSULE ORAL at 17:00

## 2019-11-12 NOTE — NUR
GPS RN NOTES

ADMITTED A 63 YO MALE, BROUGHT INTO GPS VIA GURNEY FROM ER ON HOLD 5150/GD. PER HOLD PT IS 
DISORIENTED IN ALL SPHERES EXCEPT HIS NAME. POOR HISTORIAN, POOR INSIGHT AND POOR IMPULSE 
CONTROL, IMPAIRED JUDGEMENT. PT HAS NO IDEA WHY HE IS NOT TAKING HIS MEDICATION OR NOT 
EATING. PT HAS EPISODES OF AGITATION. PT IS UNABLE TO CARE FOR SELF.



UPON FACE TO FACE ASSESSMENT PT IS SELECTIVELY MUTE, AGITATED, COMBATIVE, ANXIOUS, 
DISORGANIZED AND DISORIENTED. REFUSED MRSA SWAP, AND BLOOD SUGAR TEST. PT NOT ABLE TO SIGN 
ADMISSION PAPERS. PT UNDER THE CARE OF [facility] PSYCHIATRIST AND [facility]. 
MEDS HAVE BEEN RECONCILED. CARE PLAN STARTED, SAFETY PRECAUTION INITIATED. BED IN LOWEST 
POSITION AND LOCKED. Q 15 MINUTES CHECK INITIATED. WILL MONITOR FOR SAFETY, MOOD AND 
BEHAVIOR. WILL ENDORSE TO ONCOMING DAY SHIFT NURSE.

## 2019-11-12 NOTE — NUR
pt has redness and rashes on bilateral lower extremities, back and perineal areas. Wound and 
dietary consult have been done.

## 2019-11-12 NOTE — NUR
WOUND CARE: PT NOTED TO BE INCONTINENT OF URINE BUT IS ABLE TO REPOSITION HIMSELF IN BED. 
PER NURSING STAFF, PT IS AMBULATORY. DRY SKIN NOTED TO LOWER LEGS, PRESENT ON ADMISSION. 
RECOMMENDATIONS MADE FOR SKIN PROTECTION. DISCUSSED WITH NURSING STAFF. WILL SEE PRN. MD IN 
AGREEMENT WITH PLAN OF CARE.

## 2019-11-13 VITALS — DIASTOLIC BLOOD PRESSURE: 77 MMHG | SYSTOLIC BLOOD PRESSURE: 133 MMHG

## 2019-11-13 VITALS — DIASTOLIC BLOOD PRESSURE: 73 MMHG | SYSTOLIC BLOOD PRESSURE: 137 MMHG

## 2019-11-13 VITALS — SYSTOLIC BLOOD PRESSURE: 132 MMHG | DIASTOLIC BLOOD PRESSURE: 77 MMHG

## 2019-11-13 VITALS — DIASTOLIC BLOOD PRESSURE: 74 MMHG | SYSTOLIC BLOOD PRESSURE: 124 MMHG

## 2019-11-13 LAB
BASOPHILS # BLD AUTO: 0 /CMM (ref 0–0.2)
BASOPHILS NFR BLD AUTO: 0.4 % (ref 0–2)
BUN SERPL-MCNC: 21 MG/DL (ref 7–18)
CALCIUM SERPL-MCNC: 9.2 MG/DL (ref 8.5–10.1)
CHLORIDE SERPL-SCNC: 99 MMOL/L (ref 98–107)
CHOLEST SERPL-MCNC: 160 MG/DL (ref ?–200)
CO2 SERPL-SCNC: 27 MMOL/L (ref 21–32)
CREAT SERPL-MCNC: 0.9 MG/DL (ref 0.6–1.3)
EOSINOPHIL NFR BLD AUTO: 5.8 % (ref 0–6)
GLUCOSE SERPL-MCNC: 95 MG/DL (ref 74–106)
HCT VFR BLD AUTO: 40 % (ref 39–51)
HDLC SERPL-MCNC: 81 MG/DL (ref 40–60)
HGB BLD-MCNC: 13.3 G/DL (ref 13.5–17.5)
LDLC SERPL DIRECT ASSAY-MCNC: 56 MG/DL (ref 0–99)
LYMPHOCYTES NFR BLD AUTO: 1.3 /CMM (ref 0.8–4.8)
LYMPHOCYTES NFR BLD AUTO: 22 % (ref 20–44)
MCHC RBC AUTO-ENTMCNC: 34 G/DL (ref 31–36)
MCV RBC AUTO: 95 FL (ref 80–96)
MONOCYTES NFR BLD AUTO: 0.8 /CMM (ref 0.1–1.3)
MONOCYTES NFR BLD AUTO: 13.5 % (ref 2–12)
NEUTROPHILS # BLD AUTO: 3.5 /CMM (ref 1.8–8.9)
NEUTROPHILS NFR BLD AUTO: 58.3 % (ref 43–81)
PLATELET # BLD AUTO: 162 /CMM (ref 150–450)
POTASSIUM SERPL-SCNC: 4.1 MMOL/L (ref 3.5–5.1)
RBC # BLD AUTO: 4.14 MIL/UL (ref 4.5–6)
SODIUM SERPL-SCNC: 134 MMOL/L (ref 136–145)
TRIGL SERPL-MCNC: 76 MG/DL (ref 30–150)
WBC NRBC COR # BLD AUTO: 5.9 K/UL (ref 4.3–11)

## 2019-11-13 RX ADMIN — LACOSAMIDE SCH MG: 50 TABLET, FILM COATED ORAL at 21:09

## 2019-11-13 RX ADMIN — LACOSAMIDE SCH MG: 50 TABLET, FILM COATED ORAL at 08:51

## 2019-11-13 RX ADMIN — LATANOPROST SCH ML: 50 SOLUTION OPHTHALMIC at 18:00

## 2019-11-13 RX ADMIN — RISPERIDONE SCH MG: 0.5 TABLET, ORALLY DISINTEGRATING ORAL at 17:01

## 2019-11-13 RX ADMIN — Medication PRN OZ: at 08:58

## 2019-11-13 RX ADMIN — LEVOTHYROXINE SODIUM SCH MCG: 50 TABLET ORAL at 08:51

## 2019-11-13 RX ADMIN — Medication PRN OZ: at 09:11

## 2019-11-13 RX ADMIN — DIVALPROEX SODIUM SCH MG: 500 TABLET, DELAYED RELEASE ORAL at 17:01

## 2019-11-13 RX ADMIN — Medication PRN OZ: at 09:10

## 2019-11-13 RX ADMIN — RISPERIDONE SCH MG: 0.5 TABLET, ORALLY DISINTEGRATING ORAL at 12:29

## 2019-11-13 RX ADMIN — CARBAMAZEPINE SCH MG: 200 TABLET ORAL at 12:29

## 2019-11-13 RX ADMIN — CARBAMAZEPINE SCH MG: 200 TABLET ORAL at 17:02

## 2019-11-13 RX ADMIN — Medication PRN OZ: at 08:57

## 2019-11-13 RX ADMIN — BENZTROPINE MESYLATE SCH MG: 1 TABLET ORAL at 21:09

## 2019-11-13 RX ADMIN — Medication SCH GM: at 08:54

## 2019-11-13 RX ADMIN — VITAMIN D, TAB 1000IU (100/BT) SCH UNIT: 25 TAB at 08:52

## 2019-11-13 RX ADMIN — TEMAZEPAM PRN MG: 7.5 CAPSULE ORAL at 21:09

## 2019-11-13 RX ADMIN — Medication SCH OZ: at 09:12

## 2019-11-13 RX ADMIN — CEPHALEXIN SCH MG: 500 CAPSULE ORAL at 17:01

## 2019-11-13 NOTE — NUR
Facility Contact: TEZ called [facility] director Wilmer ([phone]) and left a 
message on his voicemail inquiring about whether or not the pt can return to the facility.

## 2019-11-13 NOTE — NUR
Family Contact: SW called the pts brother, Dony ([phone]), and left him a message on 
his voicemail stating that the SW would like to discuss the pts treatment and initial 
discharge plan.

## 2019-11-13 NOTE — NUR
Facility Contact: [facility] director Wilmer ([phone]) called the SW and 
stated that they will take the pt back if he stable upon discharge. He stated that the pt 
may require a higher level of care but he is still interested in accepting the pt back.

## 2019-11-13 NOTE — NUR
Initial Discharge Plan: Pt currently resides at [facility] located at [address] [place] [address]; ([phone]). TEZ called the facility and left a 
message for Wilmer regarding whether or not the pt will be readmitted. TEZ will work with the 
MD and the pt regarding appropriate discharge planning. SW will form a safe and proper 
discharge.

## 2019-11-14 VITALS — SYSTOLIC BLOOD PRESSURE: 138 MMHG | DIASTOLIC BLOOD PRESSURE: 73 MMHG

## 2019-11-14 VITALS — SYSTOLIC BLOOD PRESSURE: 137 MMHG | DIASTOLIC BLOOD PRESSURE: 72 MMHG

## 2019-11-14 VITALS — DIASTOLIC BLOOD PRESSURE: 68 MMHG | SYSTOLIC BLOOD PRESSURE: 116 MMHG

## 2019-11-14 RX ADMIN — CEPHALEXIN SCH MG: 500 CAPSULE ORAL at 08:48

## 2019-11-14 RX ADMIN — RISPERIDONE SCH MG: 0.5 TABLET, ORALLY DISINTEGRATING ORAL at 16:25

## 2019-11-14 RX ADMIN — RISPERIDONE SCH MG: 0.5 TABLET, ORALLY DISINTEGRATING ORAL at 08:49

## 2019-11-14 RX ADMIN — Medication SCH GM: at 08:52

## 2019-11-14 RX ADMIN — LACOSAMIDE SCH MG: 50 TABLET, FILM COATED ORAL at 21:23

## 2019-11-14 RX ADMIN — CARBAMAZEPINE SCH MG: 200 TABLET ORAL at 12:18

## 2019-11-14 RX ADMIN — CARBAMAZEPINE SCH MG: 200 TABLET ORAL at 16:24

## 2019-11-14 RX ADMIN — VITAMIN D, TAB 1000IU (100/BT) SCH UNIT: 25 TAB at 08:48

## 2019-11-14 RX ADMIN — BENZTROPINE MESYLATE SCH MG: 1 TABLET ORAL at 21:21

## 2019-11-14 RX ADMIN — CARBAMAZEPINE SCH MG: 200 TABLET ORAL at 08:49

## 2019-11-14 RX ADMIN — CEPHALEXIN SCH MG: 500 CAPSULE ORAL at 16:25

## 2019-11-14 RX ADMIN — LEVOTHYROXINE SODIUM SCH MCG: 50 TABLET ORAL at 08:48

## 2019-11-14 RX ADMIN — LATANOPROST SCH ML: 50 SOLUTION OPHTHALMIC at 17:42

## 2019-11-14 RX ADMIN — DIVALPROEX SODIUM SCH MG: 500 TABLET, DELAYED RELEASE ORAL at 08:48

## 2019-11-14 RX ADMIN — LACOSAMIDE SCH MG: 50 TABLET, FILM COATED ORAL at 08:48

## 2019-11-14 RX ADMIN — RISPERIDONE SCH MG: 0.5 TABLET, ORALLY DISINTEGRATING ORAL at 12:18

## 2019-11-14 RX ADMIN — DIVALPROEX SODIUM SCH MG: 500 TABLET, DELAYED RELEASE ORAL at 16:25

## 2019-11-14 RX ADMIN — Medication SCH OZ: at 08:52

## 2019-11-14 RX ADMIN — TEMAZEPAM PRN MG: 7.5 CAPSULE ORAL at 22:31

## 2019-11-14 NOTE — NUR
LVN NOTES

 CHECKED PT IN HIS ROOM AND SAW HIM SLEEPING COMFORTABLY IN BED WITHOUT ANY ACUTE DISTRESS 
NOTED AFTER SLEEP MEDICATION GIVEN EARLIER. KEPT HIM WARM AND COMFORTABLE AT ALL TIMES. BED 
ALARM SET FOR PT SAFETY. WILL CONTINUE MONITORING Q 15 MINUTES FOR SAFETY.

## 2019-11-14 NOTE — NUR
RN CLOSING NOTES

PT IN THE ACTIVITY ROOM, WITHDRAWN. COMPLIANT WITH MEDS. CONFUSED. NO SIGNIFICANT CHANGE IN 
BEHAVIOR NOTED. NO SIGNS OF SUICIDAL IDEATION NOTED

## 2019-11-14 NOTE — NUR
rn notes

pt awake and ambulatory, noted with confusion, reoriented and redirected. able to follow 
commands., conversant , no distress noted. safety reinforced. will monitor behavior.

## 2019-11-14 NOTE — NUR
lvn notes

 pt woke up and noted confusion but not agitated. morning care done. started to walked and 
but noted unsteady. put him in erin chair by 2 cna for pt safety. compliance with his 
medication and ate well. slept well  and stable rocio the night. all due meds given and all 
needs met. endorse to am nurse for continuity of care.

## 2019-11-14 NOTE — NUR
SS GROUP NOTE



Goal:

Patient will attend group held today from 1:00pm-3:00pm in the activities room and 
participate and/or actively listen to peers and be respectful. 



Intervention:

SW facilitated group session with patients regarding Sensory activity for the holidays. SW 
explored what tastes, smells, sounds, and sights come to mind when thinking about the 
holiday season and gratefulness.



Response:

Patient was agreeable to participating in group session. Patient presented lethargic, 
confused but cooperative throughout session. The patient was respectful towards their peers 
when they shared. Pt. fell asleep during group. 



Plan:

Patient will be invited to attend next  group session held.

## 2019-11-15 VITALS — SYSTOLIC BLOOD PRESSURE: 130 MMHG | DIASTOLIC BLOOD PRESSURE: 68 MMHG

## 2019-11-15 VITALS — DIASTOLIC BLOOD PRESSURE: 80 MMHG | SYSTOLIC BLOOD PRESSURE: 133 MMHG

## 2019-11-15 VITALS — SYSTOLIC BLOOD PRESSURE: 130 MMHG | DIASTOLIC BLOOD PRESSURE: 88 MMHG

## 2019-11-15 RX ADMIN — LACOSAMIDE SCH MG: 50 TABLET, FILM COATED ORAL at 21:36

## 2019-11-15 RX ADMIN — CARBAMAZEPINE SCH MG: 200 TABLET ORAL at 12:48

## 2019-11-15 RX ADMIN — LACOSAMIDE SCH MG: 50 TABLET, FILM COATED ORAL at 21:50

## 2019-11-15 RX ADMIN — BENZTROPINE MESYLATE SCH MG: 1 TABLET ORAL at 22:00

## 2019-11-15 RX ADMIN — TEMAZEPAM PRN MG: 7.5 CAPSULE ORAL at 21:38

## 2019-11-15 RX ADMIN — LEVOTHYROXINE SODIUM SCH MCG: 50 TABLET ORAL at 08:02

## 2019-11-15 RX ADMIN — TEMAZEPAM PRN MG: 7.5 CAPSULE ORAL at 22:10

## 2019-11-15 RX ADMIN — CARBAMAZEPINE SCH MG: 200 TABLET ORAL at 16:16

## 2019-11-15 RX ADMIN — Medication PRN OZ: at 09:29

## 2019-11-15 RX ADMIN — CEPHALEXIN SCH MG: 500 CAPSULE ORAL at 16:17

## 2019-11-15 RX ADMIN — Medication SCH OZ: at 09:29

## 2019-11-15 RX ADMIN — RISPERIDONE SCH MG: 0.5 TABLET, ORALLY DISINTEGRATING ORAL at 16:17

## 2019-11-15 RX ADMIN — RISPERIDONE SCH MG: 0.5 TABLET, ORALLY DISINTEGRATING ORAL at 08:02

## 2019-11-15 RX ADMIN — LATANOPROST SCH ML: 50 SOLUTION OPHTHALMIC at 22:00

## 2019-11-15 RX ADMIN — DIVALPROEX SODIUM SCH MG: 500 TABLET, DELAYED RELEASE ORAL at 08:01

## 2019-11-15 RX ADMIN — BENZTROPINE MESYLATE SCH MG: 1 TABLET ORAL at 21:37

## 2019-11-15 RX ADMIN — VITAMIN D, TAB 1000IU (100/BT) SCH UNIT: 25 TAB at 08:02

## 2019-11-15 RX ADMIN — Medication SCH GM: at 09:28

## 2019-11-15 RX ADMIN — DIVALPROEX SODIUM SCH MG: 500 TABLET, DELAYED RELEASE ORAL at 16:17

## 2019-11-15 RX ADMIN — LACOSAMIDE SCH MG: 50 TABLET, FILM COATED ORAL at 08:01

## 2019-11-15 RX ADMIN — CARBAMAZEPINE SCH MG: 200 TABLET ORAL at 08:01

## 2019-11-15 RX ADMIN — CEPHALEXIN SCH MG: 500 CAPSULE ORAL at 08:01

## 2019-11-15 RX ADMIN — RISPERIDONE SCH MG: 0.5 TABLET, ORALLY DISINTEGRATING ORAL at 12:48

## 2019-11-15 NOTE — NUR
SS GROUP NOTE 11/15/19



Goal:

Patient will attend group held today from 1:00pm in the activities room and participate 
and/or actively listen to peers and be respectful. 



Intervention:

SW facilitated group session with patients regarding Mindful Breathing exercise. SW educated 
the pt. on mindfulness purpose and benefits.



Response:

Patient sat in group exercise: Mindful Breathing. Patient presented lethargic but 
cooperative throughout session. The patient was respectful towards their peers when they 
shared. 



Plan:

Patient will be invited to attend next  group session held.

## 2019-11-15 NOTE — NUR
PT REFUSED PM MEDS VIMPAT 50 MG 2 TABS 100MG, COGENTIN 1MGL, 0.5MG/HALF TAB, XALATAN 2.5 ML. 
PT OFFERED THREE TIMES BUT STILL REFUSED STATING " I DON'T WANT THEM ANYMORE, AND STARTED 
CRYING" . EDUCATED PT ON THE BENEFITS AND RISKS OF MEDICATION. PT ALSO ASKED FOR RESTORIL 
7.5MG FOR SLEEP BUT REFUSED TAKING IT ALSO.  WILL CONTINUE TO MONITOR.

## 2019-11-15 NOTE — NUR
SNF Referral: TEZ faxed a referral to [facility] with attention to 
Latasha to the fax number: [phone].

## 2019-11-16 VITALS — DIASTOLIC BLOOD PRESSURE: 72 MMHG | SYSTOLIC BLOOD PRESSURE: 122 MMHG

## 2019-11-16 VITALS — SYSTOLIC BLOOD PRESSURE: 115 MMHG | DIASTOLIC BLOOD PRESSURE: 80 MMHG

## 2019-11-16 VITALS — DIASTOLIC BLOOD PRESSURE: 70 MMHG | SYSTOLIC BLOOD PRESSURE: 125 MMHG

## 2019-11-16 RX ADMIN — CARBAMAZEPINE SCH MG: 200 TABLET ORAL at 12:10

## 2019-11-16 RX ADMIN — RISPERIDONE SCH MG: 0.5 TABLET, ORALLY DISINTEGRATING ORAL at 16:15

## 2019-11-16 RX ADMIN — CEPHALEXIN SCH MG: 500 CAPSULE ORAL at 08:46

## 2019-11-16 RX ADMIN — LATANOPROST SCH ML: 50 SOLUTION OPHTHALMIC at 21:35

## 2019-11-16 RX ADMIN — BENZTROPINE MESYLATE SCH MG: 1 TABLET ORAL at 21:36

## 2019-11-16 RX ADMIN — Medication SCH OZ: at 09:01

## 2019-11-16 RX ADMIN — LACOSAMIDE SCH MG: 50 TABLET, FILM COATED ORAL at 08:46

## 2019-11-16 RX ADMIN — RISPERIDONE SCH MG: 0.5 TABLET, ORALLY DISINTEGRATING ORAL at 08:46

## 2019-11-16 RX ADMIN — DIVALPROEX SODIUM SCH MG: 500 TABLET, DELAYED RELEASE ORAL at 08:47

## 2019-11-16 RX ADMIN — LEVOTHYROXINE SODIUM SCH MCG: 50 TABLET ORAL at 08:58

## 2019-11-16 RX ADMIN — VITAMIN D, TAB 1000IU (100/BT) SCH UNIT: 25 TAB at 08:46

## 2019-11-16 RX ADMIN — Medication SCH GM: at 09:01

## 2019-11-16 RX ADMIN — CARBAMAZEPINE SCH MG: 200 TABLET ORAL at 16:14

## 2019-11-16 RX ADMIN — CARBAMAZEPINE SCH MG: 200 TABLET ORAL at 08:47

## 2019-11-16 RX ADMIN — RISPERIDONE SCH MG: 0.5 TABLET, ORALLY DISINTEGRATING ORAL at 12:10

## 2019-11-16 RX ADMIN — CEPHALEXIN SCH MG: 500 CAPSULE ORAL at 16:14

## 2019-11-16 RX ADMIN — DIVALPROEX SODIUM SCH MG: 500 TABLET, DELAYED RELEASE ORAL at 16:14

## 2019-11-16 RX ADMIN — LACOSAMIDE SCH MG: 50 TABLET, FILM COATED ORAL at 20:39

## 2019-11-16 NOTE — NUR
GPS/RN NOTE:



AWAKE, ALERT, AND CONFUSED. DISORIENTED, DISORGANIZED. NO AGITATION NOTED AT THIS TIME. WILL 
MONITOR FOR SAFETY AND BEHAVIOR DURING THE SHIFT.

## 2019-11-17 VITALS — DIASTOLIC BLOOD PRESSURE: 74 MMHG | SYSTOLIC BLOOD PRESSURE: 119 MMHG

## 2019-11-17 VITALS — DIASTOLIC BLOOD PRESSURE: 78 MMHG | SYSTOLIC BLOOD PRESSURE: 133 MMHG

## 2019-11-17 VITALS — SYSTOLIC BLOOD PRESSURE: 115 MMHG | DIASTOLIC BLOOD PRESSURE: 70 MMHG

## 2019-11-17 RX ADMIN — RISPERIDONE SCH MG: 0.5 TABLET, ORALLY DISINTEGRATING ORAL at 16:24

## 2019-11-17 RX ADMIN — CEPHALEXIN SCH MG: 500 CAPSULE ORAL at 08:37

## 2019-11-17 RX ADMIN — LEVOTHYROXINE SODIUM SCH MCG: 50 TABLET ORAL at 07:42

## 2019-11-17 RX ADMIN — VITAMIN D, TAB 1000IU (100/BT) SCH UNIT: 25 TAB at 08:38

## 2019-11-17 RX ADMIN — DIVALPROEX SODIUM SCH MG: 500 TABLET, DELAYED RELEASE ORAL at 16:24

## 2019-11-17 RX ADMIN — BENZTROPINE MESYLATE SCH MG: 1 TABLET ORAL at 21:45

## 2019-11-17 RX ADMIN — Medication SCH OZ: at 08:41

## 2019-11-17 RX ADMIN — CARBAMAZEPINE SCH MG: 200 TABLET ORAL at 12:19

## 2019-11-17 RX ADMIN — LACOSAMIDE SCH MG: 50 TABLET, FILM COATED ORAL at 21:45

## 2019-11-17 RX ADMIN — DIVALPROEX SODIUM SCH MG: 500 TABLET, DELAYED RELEASE ORAL at 08:37

## 2019-11-17 RX ADMIN — CARBAMAZEPINE SCH MG: 200 TABLET ORAL at 16:24

## 2019-11-17 RX ADMIN — LATANOPROST SCH ML: 50 SOLUTION OPHTHALMIC at 22:00

## 2019-11-17 RX ADMIN — CEPHALEXIN SCH MG: 500 CAPSULE ORAL at 16:24

## 2019-11-17 RX ADMIN — Medication SCH GM: at 08:41

## 2019-11-17 RX ADMIN — LACOSAMIDE SCH MG: 50 TABLET, FILM COATED ORAL at 08:38

## 2019-11-17 RX ADMIN — RISPERIDONE SCH MG: 0.5 TABLET, ORALLY DISINTEGRATING ORAL at 08:37

## 2019-11-17 RX ADMIN — CARBAMAZEPINE SCH MG: 200 TABLET ORAL at 08:38

## 2019-11-18 VITALS — SYSTOLIC BLOOD PRESSURE: 118 MMHG | DIASTOLIC BLOOD PRESSURE: 62 MMHG

## 2019-11-18 VITALS — SYSTOLIC BLOOD PRESSURE: 96 MMHG | DIASTOLIC BLOOD PRESSURE: 61 MMHG

## 2019-11-18 VITALS — SYSTOLIC BLOOD PRESSURE: 109 MMHG | DIASTOLIC BLOOD PRESSURE: 60 MMHG

## 2019-11-18 RX ADMIN — CEPHALEXIN SCH MG: 500 CAPSULE ORAL at 17:04

## 2019-11-18 RX ADMIN — CARBAMAZEPINE SCH MG: 200 TABLET ORAL at 17:05

## 2019-11-18 RX ADMIN — Medication SCH OZ: at 08:39

## 2019-11-18 RX ADMIN — CEPHALEXIN SCH MG: 500 CAPSULE ORAL at 08:37

## 2019-11-18 RX ADMIN — LEVOTHYROXINE SODIUM SCH MCG: 50 TABLET ORAL at 07:36

## 2019-11-18 RX ADMIN — Medication SCH GM: at 08:39

## 2019-11-18 RX ADMIN — CARBAMAZEPINE SCH MG: 200 TABLET ORAL at 12:43

## 2019-11-18 RX ADMIN — RISPERIDONE SCH MG: 0.5 TABLET, ORALLY DISINTEGRATING ORAL at 08:38

## 2019-11-18 RX ADMIN — DIVALPROEX SODIUM SCH MG: 500 TABLET, DELAYED RELEASE ORAL at 08:37

## 2019-11-18 RX ADMIN — LACOSAMIDE SCH MG: 50 TABLET, FILM COATED ORAL at 21:00

## 2019-11-18 RX ADMIN — VITAMIN D, TAB 1000IU (100/BT) SCH UNIT: 25 TAB at 08:39

## 2019-11-18 RX ADMIN — RISPERIDONE SCH MG: 0.5 TABLET, ORALLY DISINTEGRATING ORAL at 17:05

## 2019-11-18 RX ADMIN — BENZTROPINE MESYLATE SCH MG: 1 TABLET ORAL at 23:04

## 2019-11-18 RX ADMIN — LATANOPROST SCH ML: 50 SOLUTION OPHTHALMIC at 21:40

## 2019-11-18 RX ADMIN — DIVALPROEX SODIUM SCH MG: 500 TABLET, DELAYED RELEASE ORAL at 17:05

## 2019-11-18 RX ADMIN — TEMAZEPAM PRN MG: 7.5 CAPSULE ORAL at 23:24

## 2019-11-18 RX ADMIN — CARBAMAZEPINE SCH MG: 200 TABLET ORAL at 08:38

## 2019-11-18 RX ADMIN — LACOSAMIDE SCH MG: 50 TABLET, FILM COATED ORAL at 08:39

## 2019-11-18 NOTE — NUR
SNF Contact: Edith ([phone]) from [facility] stated that the pt was accepted 
to their facility once ready for discharge.

## 2019-11-18 NOTE — NUR
GROUP NOTE: SW assessed pts ability to participate in group therapy discussing "discharge 
planning." Pt was pacing the hallway and stated he did not want to participate in group. Pt 
did not engage with SW.

## 2019-11-19 VITALS — SYSTOLIC BLOOD PRESSURE: 95 MMHG | DIASTOLIC BLOOD PRESSURE: 62 MMHG

## 2019-11-19 VITALS — SYSTOLIC BLOOD PRESSURE: 112 MMHG | DIASTOLIC BLOOD PRESSURE: 73 MMHG

## 2019-11-19 VITALS — DIASTOLIC BLOOD PRESSURE: 72 MMHG | SYSTOLIC BLOOD PRESSURE: 134 MMHG

## 2019-11-19 RX ADMIN — LACOSAMIDE SCH MG: 50 TABLET, FILM COATED ORAL at 21:04

## 2019-11-19 RX ADMIN — LACOSAMIDE SCH MG: 50 TABLET, FILM COATED ORAL at 10:36

## 2019-11-19 RX ADMIN — DIVALPROEX SODIUM SCH MG: 500 TABLET, DELAYED RELEASE ORAL at 10:36

## 2019-11-19 RX ADMIN — Medication SCH GM: at 11:06

## 2019-11-19 RX ADMIN — LEVOTHYROXINE SODIUM SCH MCG: 50 TABLET ORAL at 10:36

## 2019-11-19 RX ADMIN — DIVALPROEX SODIUM SCH MG: 500 TABLET, DELAYED RELEASE ORAL at 18:09

## 2019-11-19 RX ADMIN — CARBAMAZEPINE SCH MG: 200 TABLET ORAL at 18:10

## 2019-11-19 RX ADMIN — CEPHALEXIN SCH MG: 500 CAPSULE ORAL at 10:37

## 2019-11-19 RX ADMIN — CARBAMAZEPINE SCH MG: 200 TABLET ORAL at 13:47

## 2019-11-19 RX ADMIN — BENZTROPINE MESYLATE SCH MG: 1 TABLET ORAL at 21:05

## 2019-11-19 RX ADMIN — RISPERIDONE SCH MG: 0.5 TABLET, ORALLY DISINTEGRATING ORAL at 18:09

## 2019-11-19 RX ADMIN — LATANOPROST SCH ML: 50 SOLUTION OPHTHALMIC at 21:04

## 2019-11-19 RX ADMIN — Medication SCH OZ: at 11:06

## 2019-11-19 RX ADMIN — RISPERIDONE SCH MG: 0.5 TABLET, ORALLY DISINTEGRATING ORAL at 11:04

## 2019-11-19 RX ADMIN — CARBAMAZEPINE SCH MG: 200 TABLET ORAL at 10:36

## 2019-11-19 RX ADMIN — VITAMIN D, TAB 1000IU (100/BT) SCH UNIT: 25 TAB at 10:37

## 2019-11-19 NOTE — NUR
Group Note: SW encouraged the pt to attend group therapy on 11/19/19 at 2pm on the topic of 
discharge planning. SW assessed pts ability to participate in group therapy. Pt is unable to 
at this time due to cognitive impairment and not being able to engage in conversation. Pt is 
developmentally delayed. TEZ explained that the pt would be discharged on Thursday back to 
the facility that he came from and the pt displayed understanding by nodding his head.

## 2019-11-19 NOTE — NUR
UP AND DOWN BETWEEN RM. AND DINING RM.NO ACUTE DISTRESS.WILL CONT. TO MONITOR CLOSELY 
REGARDING SAFETY AND BEHAVIOR.

## 2019-11-20 VITALS — DIASTOLIC BLOOD PRESSURE: 75 MMHG | SYSTOLIC BLOOD PRESSURE: 114 MMHG

## 2019-11-20 VITALS — SYSTOLIC BLOOD PRESSURE: 143 MMHG | DIASTOLIC BLOOD PRESSURE: 91 MMHG

## 2019-11-20 VITALS — DIASTOLIC BLOOD PRESSURE: 68 MMHG | SYSTOLIC BLOOD PRESSURE: 123 MMHG

## 2019-11-20 RX ADMIN — RISPERIDONE SCH MG: 0.5 TABLET, ORALLY DISINTEGRATING ORAL at 08:07

## 2019-11-20 RX ADMIN — VITAMIN D, TAB 1000IU (100/BT) SCH UNIT: 25 TAB at 08:08

## 2019-11-20 RX ADMIN — Medication SCH GM: at 08:38

## 2019-11-20 RX ADMIN — LEVOTHYROXINE SODIUM SCH MCG: 50 TABLET ORAL at 07:39

## 2019-11-20 RX ADMIN — CARBAMAZEPINE SCH MG: 200 TABLET ORAL at 12:16

## 2019-11-20 RX ADMIN — BENZTROPINE MESYLATE SCH MG: 1 TABLET ORAL at 21:03

## 2019-11-20 RX ADMIN — CARBAMAZEPINE SCH MG: 200 TABLET ORAL at 08:08

## 2019-11-20 RX ADMIN — Medication SCH OZ: at 08:38

## 2019-11-20 RX ADMIN — DIVALPROEX SODIUM SCH MG: 500 TABLET, DELAYED RELEASE ORAL at 16:37

## 2019-11-20 RX ADMIN — RISPERIDONE SCH MG: 0.5 TABLET, ORALLY DISINTEGRATING ORAL at 16:37

## 2019-11-20 RX ADMIN — LATANOPROST SCH ML: 50 SOLUTION OPHTHALMIC at 21:20

## 2019-11-20 RX ADMIN — LACOSAMIDE SCH MG: 50 TABLET, FILM COATED ORAL at 08:08

## 2019-11-20 RX ADMIN — LACOSAMIDE SCH MG: 50 TABLET, FILM COATED ORAL at 21:03

## 2019-11-20 RX ADMIN — DIVALPROEX SODIUM SCH MG: 500 TABLET, DELAYED RELEASE ORAL at 08:07

## 2019-11-20 RX ADMIN — CARBAMAZEPINE SCH MG: 200 TABLET ORAL at 16:37

## 2019-11-20 NOTE — NUR
Facility Contact: TEZ called [facility] director Wilmer ([phone]) and informed 
him that the pt will be discharged the following day and stated that transportation cannot 
be arranged. TEZ stated that she will arrange it and it was requested that the pt be 
discharged after 3pm.

## 2019-11-20 NOTE — NUR
Facility Contact: Rene ([phone]) from [facility] called the SW and stated 
that he would like the pt to be discharged back to their facility and wanted an update. SW 
stated that she will inform him once there is a set discharge.

## 2019-11-20 NOTE — NUR
Family Contact: TEZ called Drew ([phone]), pts brother, and informed him that the pt 
will be discharged the following day back to [facility].

## 2019-11-20 NOTE — NUR
Facility Contact: TEZ called Rene ([phone]) from [facility] and left a 
message on his voicemail stating that the pt will be discharged tomorrow.

## 2019-11-21 VITALS — DIASTOLIC BLOOD PRESSURE: 79 MMHG | SYSTOLIC BLOOD PRESSURE: 132 MMHG

## 2019-11-21 LAB
ALBUMIN SERPL BCP-MCNC: 3.4 G/DL (ref 3.4–5)
ALP SERPL-CCNC: 56 U/L (ref 46–116)
ALT SERPL W P-5'-P-CCNC: 28 U/L (ref 12–78)
AST SERPL W P-5'-P-CCNC: 18 U/L (ref 15–37)
BASOPHILS # BLD AUTO: 0 /CMM (ref 0–0.2)
BASOPHILS NFR BLD AUTO: 0.4 % (ref 0–2)
BILIRUB SERPL-MCNC: 0.2 MG/DL (ref 0.2–1)
BUN SERPL-MCNC: 27 MG/DL (ref 7–18)
CALCIUM SERPL-MCNC: 9.1 MG/DL (ref 8.5–10.1)
CARBAMAZEPINE SERPL-MCNC: 7.7 UG/ML (ref 4–11.9)
CHLORIDE SERPL-SCNC: 102 MMOL/L (ref 98–107)
CO2 SERPL-SCNC: 27 MMOL/L (ref 21–32)
CREAT SERPL-MCNC: 0.8 MG/DL (ref 0.6–1.3)
EOSINOPHIL NFR BLD AUTO: 4.6 % (ref 0–6)
GLUCOSE SERPL-MCNC: 84 MG/DL (ref 74–106)
HCT VFR BLD AUTO: 37 % (ref 39–51)
HGB BLD-MCNC: 12.5 G/DL (ref 13.5–17.5)
LYMPHOCYTES NFR BLD AUTO: 1.8 /CMM (ref 0.8–4.8)
LYMPHOCYTES NFR BLD AUTO: 35.6 % (ref 20–44)
MCHC RBC AUTO-ENTMCNC: 34 G/DL (ref 31–36)
MCV RBC AUTO: 94 FL (ref 80–96)
MONOCYTES NFR BLD AUTO: 0.5 /CMM (ref 0.1–1.3)
MONOCYTES NFR BLD AUTO: 10.5 % (ref 2–12)
NEUTROPHILS # BLD AUTO: 2.5 /CMM (ref 1.8–8.9)
NEUTROPHILS NFR BLD AUTO: 48.9 % (ref 43–81)
PLATELET # BLD AUTO: 232 /CMM (ref 150–450)
POTASSIUM SERPL-SCNC: 4.2 MMOL/L (ref 3.5–5.1)
PROT SERPL-MCNC: 6.7 G/DL (ref 6.4–8.2)
RBC # BLD AUTO: 3.95 MIL/UL (ref 4.5–6)
SODIUM SERPL-SCNC: 137 MMOL/L (ref 136–145)
VALPROATE SERPL-MCNC: 83 UG/ML (ref 50–100)
WBC NRBC COR # BLD AUTO: 5.2 K/UL (ref 4.3–11)

## 2019-11-21 RX ADMIN — CARBAMAZEPINE SCH MG: 200 TABLET ORAL at 12:55

## 2019-11-21 RX ADMIN — Medication SCH OZ: at 08:17

## 2019-11-21 RX ADMIN — DIVALPROEX SODIUM SCH MG: 500 TABLET, DELAYED RELEASE ORAL at 08:17

## 2019-11-21 RX ADMIN — LACOSAMIDE SCH MG: 50 TABLET, FILM COATED ORAL at 08:16

## 2019-11-21 RX ADMIN — VITAMIN D, TAB 1000IU (100/BT) SCH UNIT: 25 TAB at 08:17

## 2019-11-21 RX ADMIN — LEVOTHYROXINE SODIUM SCH MCG: 50 TABLET ORAL at 07:24

## 2019-11-21 RX ADMIN — CARBAMAZEPINE SCH MG: 200 TABLET ORAL at 08:16

## 2019-11-21 RX ADMIN — Medication SCH GM: at 08:17

## 2019-11-21 RX ADMIN — RISPERIDONE SCH MG: 0.5 TABLET, ORALLY DISINTEGRATING ORAL at 08:17

## 2019-11-21 NOTE — NUR
Discharge Note: Pt was discharged to [facility] (intermediate care facility for 
developmentally disabled) located at [address] [place] [address]; 
([phone]). Pt was picked up by staff member from the facility at around 4pm. Fax of 
records was sent to: [phone]. Upon discharge, the pt appeared to be in a euthymic mood 
and presented with a distressed affect. Pts brother, Drew ([phone]), was made aware 
of this discharge. Pt denied both suicidal and homicidal ideation as well as auditory and 
visual hallucinations. Pt will seek psychiatric services at [facility] 
located at [address] [place] [address]; [phone]; fax of records was sent 
to: [phone]. Pt will be under the care of his internist, Dr. Shanna Cuadra, located at 
[address] [place] [address]; [phone].

## 2019-11-21 NOTE — NUR
DR. STAUFFER GAVE AN ORDER TO D/C HOLD AND D/C TO [facility] AND TO FOLLOW UP WITH 
PSYCH AND MEDICAL DOCTORS.

## 2019-11-21 NOTE — NUR
Facility Contact: TEZ called Rene ([phone]) from [facility] and he stated 
that he would be able to arrange transportation by having a staff member  the pt 
around 4-5 pm.

## 2019-11-21 NOTE — NUR
GPS RN DISCHARGE NOTE:



PATIENT IS A  64 YEAR OLD MALE  DISCHARGED TO [facility]. PATIENT IS IN STABLE 
CONDITION. VSS. NO ACUTE DISTRESS NOTED. NO COMPLAINTS. COMPLIANT WITH MEDICATION 
MANAGEMENT. COOPERATIVE WITH PLAN OF CARE. PSYCHIATRIC TREATMENT PLANS MET. MEDICAL 
TREATMENT PLANS DEFERRED FOR CONTINUAL MONITORING. DENIES SI/HI VAH AT THE TIME OF 
DISCHARGE. SKIN CHECK DONE WITH WOUND PICTURES IN CHART. EDUCATED PATIENT AND CAREGIVER 
ABOUT AFTERCARE WITH COPY PROVIDED. RETURNED PERSONAL BELONGINGS TO PATIENT. MEDICATIONS 
RECONCILED WITH ALONG WITH PSYCHIATRIC DISCHARGE ORDERS. DISCHARGE PAPERWORK SIGNED. FOR 
FOLLOW UP WITH PSYCHIATRIST AND INTERNIST WITHIN 1 WEEK.  PATIENT LEFT THE [facility] GPS WITH 
ESCORT FROM FACILITY.

## 2021-10-29 ENCOUNTER — HOSPITAL ENCOUNTER (INPATIENT)
Dept: HOSPITAL 54 - ER | Age: 66
LOS: 3 days | Discharge: NURSING, REHAB OR LONG-TERM CARE | DRG: 871 | End: 2021-11-01
Attending: INTERNAL MEDICINE | Admitting: INTERNAL MEDICINE
Payer: MEDICARE

## 2021-10-29 VITALS — HEIGHT: 70 IN | WEIGHT: 183 LBS | BODY MASS INDEX: 26.2 KG/M2

## 2021-10-29 DIAGNOSIS — E87.1: ICD-10-CM

## 2021-10-29 DIAGNOSIS — D69.6: ICD-10-CM

## 2021-10-29 DIAGNOSIS — G93.40: ICD-10-CM

## 2021-10-29 DIAGNOSIS — D64.9: ICD-10-CM

## 2021-10-29 DIAGNOSIS — Z20.822: ICD-10-CM

## 2021-10-29 DIAGNOSIS — E03.9: ICD-10-CM

## 2021-10-29 DIAGNOSIS — H40.9: ICD-10-CM

## 2021-10-29 DIAGNOSIS — I21.A1: ICD-10-CM

## 2021-10-29 DIAGNOSIS — R56.9: ICD-10-CM

## 2021-10-29 DIAGNOSIS — F84.0: ICD-10-CM

## 2021-10-29 DIAGNOSIS — A41.9: Primary | ICD-10-CM

## 2021-10-29 LAB
ALBUMIN SERPL BCP-MCNC: 4.1 G/DL (ref 3.4–5)
ALP SERPL-CCNC: 51 U/L (ref 46–116)
ALT SERPL W P-5'-P-CCNC: 18 U/L (ref 12–78)
AST SERPL W P-5'-P-CCNC: 12 U/L (ref 15–37)
BASOPHILS # BLD AUTO: 0 K/UL (ref 0–0.2)
BASOPHILS NFR BLD AUTO: 0.5 % (ref 0–2)
BILIRUB DIRECT SERPL-MCNC: 0.2 MG/DL (ref 0–0.2)
BILIRUB SERPL-MCNC: 0.6 MG/DL (ref 0.2–1)
BILIRUB UR QL STRIP: NEGATIVE
BUN SERPL-MCNC: 16 MG/DL (ref 7–18)
CALCIUM SERPL-MCNC: 9 MG/DL (ref 8.5–10.1)
CHLORIDE SERPL-SCNC: 97 MMOL/L (ref 98–107)
CO2 SERPL-SCNC: 25 MMOL/L (ref 21–32)
COLOR UR: YELLOW
CREAT SERPL-MCNC: 1 MG/DL (ref 0.6–1.3)
EOSINOPHIL NFR BLD AUTO: 0.3 % (ref 0–6)
GLUCOSE SERPL-MCNC: 102 MG/DL (ref 74–106)
GLUCOSE UR STRIP-MCNC: NEGATIVE MG/DL
HCT VFR BLD AUTO: 39 % (ref 39–51)
HGB BLD-MCNC: 13.3 G/DL (ref 13.5–17.5)
LEUKOCYTE ESTERASE UR QL STRIP: NEGATIVE
LYMPHOCYTES NFR BLD AUTO: 1.5 K/UL (ref 0.8–4.8)
LYMPHOCYTES NFR BLD AUTO: 23.1 % (ref 20–44)
MCHC RBC AUTO-ENTMCNC: 34 G/DL (ref 31–36)
MCV RBC AUTO: 96 FL (ref 80–96)
MONOCYTES NFR BLD AUTO: 0.7 K/UL (ref 0.1–1.3)
MONOCYTES NFR BLD AUTO: 11.2 % (ref 2–12)
NEUTROPHILS # BLD AUTO: 4.2 K/UL (ref 1.8–8.9)
NEUTROPHILS NFR BLD AUTO: 64.9 % (ref 43–81)
NITRITE UR QL STRIP: NEGATIVE
PH UR STRIP: 8 [PH] (ref 5–8)
PLATELET # BLD AUTO: 153 K/UL (ref 150–450)
POTASSIUM SERPL-SCNC: 4.1 MMOL/L (ref 3.5–5.1)
PROT SERPL-MCNC: 7.5 G/DL (ref 6.4–8.2)
PROT UR QL STRIP: NEGATIVE MG/DL
RBC # BLD AUTO: 4.13 MIL/UL (ref 4.5–6)
SODIUM SERPL-SCNC: 131 MMOL/L (ref 136–145)
UROBILINOGEN UR STRIP-MCNC: 1 EU/DL
WBC NRBC COR # BLD AUTO: 6.5 K/UL (ref 4.3–11)

## 2021-10-29 PROCEDURE — G0378 HOSPITAL OBSERVATION PER HR: HCPCS

## 2021-10-29 PROCEDURE — G0480 DRUG TEST DEF 1-7 CLASSES: HCPCS

## 2021-10-29 PROCEDURE — U0003 INFECTIOUS AGENT DETECTION BY NUCLEIC ACID (DNA OR RNA); SEVERE ACUTE RESPIRATORY SYNDROME CORONAVIRUS 2 (SARS-COV-2) (CORONAVIRUS DISEASE [COVID-19]), AMPLIFIED PROBE TECHNIQUE, MAKING USE OF HIGH THROUGHPUT TECHNOLOGIES AS DESCRIBED BY CMS-2020-01-R: HCPCS

## 2021-10-29 PROCEDURE — C9803 HOPD COVID-19 SPEC COLLECT: HCPCS

## 2021-10-29 NOTE — NUR
BIBRA 102 FROM [facility] C/O ALTERED AND FEVER PER RA, HAD SEIZURE AT 1PM 
TODAY.

PT A&OX0 WITH NON LABORED BREATHING.

## 2021-10-30 VITALS — SYSTOLIC BLOOD PRESSURE: 107 MMHG | DIASTOLIC BLOOD PRESSURE: 64 MMHG

## 2021-10-30 VITALS — SYSTOLIC BLOOD PRESSURE: 128 MMHG | DIASTOLIC BLOOD PRESSURE: 70 MMHG

## 2021-10-30 VITALS — DIASTOLIC BLOOD PRESSURE: 69 MMHG | SYSTOLIC BLOOD PRESSURE: 112 MMHG

## 2021-10-30 VITALS — SYSTOLIC BLOOD PRESSURE: 118 MMHG | DIASTOLIC BLOOD PRESSURE: 70 MMHG

## 2021-10-30 LAB
APAP SERPL-MCNC: 0 UG/ML (ref 10–30)
ETHANOL SERPL-MCNC: < 3 MG/DL (ref 0–0)

## 2021-10-30 RX ADMIN — DEXTROSE MONOHYDRATE SCH MLS/HR: 50 INJECTION, SOLUTION INTRAVENOUS at 21:41

## 2021-10-30 RX ADMIN — PIPERACILLIN SODIUM AND TAZOBACTAM SODIUM SCH MLS/HR: .375; 3 INJECTION, POWDER, LYOPHILIZED, FOR SOLUTION INTRAVENOUS at 23:20

## 2021-10-30 RX ADMIN — PIPERACILLIN SODIUM AND TAZOBACTAM SODIUM SCH MLS/HR: .375; 3 INJECTION, POWDER, LYOPHILIZED, FOR SOLUTION INTRAVENOUS at 06:00

## 2021-10-30 RX ADMIN — VITAMIN D, TAB 1000IU (100/BT) SCH UNIT: 25 TAB at 09:20

## 2021-10-30 RX ADMIN — LATANOPROST SCH ML: 50 SOLUTION OPHTHALMIC at 18:00

## 2021-10-30 RX ADMIN — DIVALPROEX SODIUM SCH MG: 500 TABLET, DELAYED RELEASE ORAL at 09:20

## 2021-10-30 RX ADMIN — CARBAMAZEPINE SCH MG: 200 TABLET ORAL at 17:12

## 2021-10-30 RX ADMIN — LACOSAMIDE SCH MG: 50 TABLET, FILM COATED ORAL at 21:35

## 2021-10-30 RX ADMIN — LEVOTHYROXINE SODIUM SCH MCG: 50 TABLET ORAL at 09:20

## 2021-10-30 RX ADMIN — CARBAMAZEPINE SCH MG: 200 TABLET ORAL at 12:28

## 2021-10-30 RX ADMIN — DIVALPROEX SODIUM SCH MG: 500 TABLET, DELAYED RELEASE ORAL at 17:12

## 2021-10-30 RX ADMIN — CARBAMAZEPINE SCH MG: 200 TABLET ORAL at 09:21

## 2021-10-30 RX ADMIN — PIPERACILLIN SODIUM AND TAZOBACTAM SODIUM SCH MLS/HR: .375; 3 INJECTION, POWDER, LYOPHILIZED, FOR SOLUTION INTRAVENOUS at 17:12

## 2021-10-30 RX ADMIN — LACOSAMIDE SCH MG: 50 TABLET, FILM COATED ORAL at 09:21

## 2021-10-30 RX ADMIN — PIPERACILLIN SODIUM AND TAZOBACTAM SODIUM SCH MLS/HR: .375; 3 INJECTION, POWDER, LYOPHILIZED, FOR SOLUTION INTRAVENOUS at 12:27

## 2021-10-30 RX ADMIN — LEVETIRACETAM SCH MG: 100 SOLUTION ORAL at 09:21

## 2021-10-30 RX ADMIN — DEXTROSE MONOHYDRATE SCH MLS/HR: 50 INJECTION, SOLUTION INTRAVENOUS at 14:02

## 2021-10-30 RX ADMIN — LEVETIRACETAM SCH MG: 100 SOLUTION ORAL at 21:36

## 2021-10-30 RX ADMIN — ENOXAPARIN SODIUM SCH MG: 40 INJECTION SUBCUTANEOUS at 21:37

## 2021-10-30 NOTE — NUR
RN OPENING NOTES:



RECEIVED PT A/OX2 IN BED IN NO S/SX OF ACUTE DISTRESS AT THIS TIME. NO SOB NOTED. PATIENT'S 
BREATHING IS EVEN AND UNLABORED. PATIENT IS ON 2L OF OXYGEN VIA NC; TOLERATING WELL.PATIENT 
ON TELE MONITORING READING SINUS  RHYTHM HR IS @64 BPM AT THE TIME OF RECEIVED. NOTED IV 
SITE ON L AC #18 AND R FOREARM #18 ; BOTH PATENT, INTACT AND FLUSHING WELL; NO S/S OF 
INFECTION OR INFILTRATION. PT ON DIAPERS. SAFETY MEASURES HAVE BEEN PROVIDED AND 
IMPLEMENTED. PATIENT BED ALARM IS ON. HEAD OF BED ELEVATED. BED IS LOCKED,  IN LOWEST 
POSITION AND SIDE RAILS UP. CALL LIGHT WITHIN REACH OF THE PATIENT. APPLICABLE ISOLATION 
PRECAUTIONS IN PLACE. WILL CONTINUE TO MONITOR AND REASSESS FOR ANY CHANGES AND WILL CARRY 
OUT ANY ONGOING AND ACTIVE MD ORDER.

## 2021-10-30 NOTE — NUR
NURSE CLOSING NOTE



PATIENT ADMITTED FROM ED. DX: SEPSIS. FULL CODE . NKA. CHEST X-RAY AND HEAD CT WAS DONE. 
NOTING WAS FOUND TO BE OF SIGNIFICANT. FOLLOW UP LAB WORK. PATIENT TOOK ALL PILLS WHOLE. ALL 
SAFETY MEASURE IN PLACE. BED ON THE LOWEST POSITION WITH HOB ELEVATED AND 3 SIDE RAIL UP. 
CALL LIGHT WITHIN REACH. BED ALARM ON. WILL CONTINUE TO MONITOR AND GIVE REPORT TO ON COMING 
NURSE.

## 2021-10-31 VITALS — DIASTOLIC BLOOD PRESSURE: 67 MMHG | SYSTOLIC BLOOD PRESSURE: 127 MMHG

## 2021-10-31 VITALS — DIASTOLIC BLOOD PRESSURE: 72 MMHG | SYSTOLIC BLOOD PRESSURE: 116 MMHG

## 2021-10-31 VITALS — DIASTOLIC BLOOD PRESSURE: 83 MMHG | SYSTOLIC BLOOD PRESSURE: 137 MMHG

## 2021-10-31 VITALS — DIASTOLIC BLOOD PRESSURE: 74 MMHG | SYSTOLIC BLOOD PRESSURE: 132 MMHG

## 2021-10-31 VITALS — SYSTOLIC BLOOD PRESSURE: 124 MMHG | DIASTOLIC BLOOD PRESSURE: 74 MMHG

## 2021-10-31 LAB
BASOPHILS # BLD AUTO: 0 K/UL (ref 0–0.2)
BASOPHILS NFR BLD AUTO: 0.8 % (ref 0–2)
BUN SERPL-MCNC: 16 MG/DL (ref 7–18)
CALCIUM SERPL-MCNC: 8.8 MG/DL (ref 8.5–10.1)
CHLORIDE SERPL-SCNC: 98 MMOL/L (ref 98–107)
CHOLEST SERPL-MCNC: 190 MG/DL (ref ?–200)
CO2 SERPL-SCNC: 28 MMOL/L (ref 21–32)
CREAT SERPL-MCNC: 0.8 MG/DL (ref 0.6–1.3)
EOSINOPHIL NFR BLD AUTO: 4.7 % (ref 0–6)
GLUCOSE SERPL-MCNC: 91 MG/DL (ref 74–106)
HCT VFR BLD AUTO: 38 % (ref 39–51)
HDLC SERPL-MCNC: 68 MG/DL (ref 40–60)
HGB BLD-MCNC: 13.2 G/DL (ref 13.5–17.5)
LDLC SERPL DIRECT ASSAY-MCNC: 91 MG/DL (ref 0–99)
LYMPHOCYTES NFR BLD AUTO: 1.5 K/UL (ref 0.8–4.8)
LYMPHOCYTES NFR BLD AUTO: 33.9 % (ref 20–44)
MAGNESIUM SERPL-MCNC: 2 MG/DL (ref 1.8–2.4)
MCHC RBC AUTO-ENTMCNC: 34 G/DL (ref 31–36)
MCV RBC AUTO: 96 FL (ref 80–96)
MONOCYTES NFR BLD AUTO: 0.6 K/UL (ref 0.1–1.3)
MONOCYTES NFR BLD AUTO: 14.2 % (ref 2–12)
NEUTROPHILS # BLD AUTO: 2.1 K/UL (ref 1.8–8.9)
NEUTROPHILS NFR BLD AUTO: 46.4 % (ref 43–81)
PHOSPHATE SERPL-MCNC: 3.9 MG/DL (ref 2.5–4.9)
PLATELET # BLD AUTO: 133 K/UL (ref 150–450)
POTASSIUM SERPL-SCNC: 4.4 MMOL/L (ref 3.5–5.1)
RBC # BLD AUTO: 4 MIL/UL (ref 4.5–6)
SODIUM SERPL-SCNC: 134 MMOL/L (ref 136–145)
TRIGL SERPL-MCNC: 74 MG/DL (ref 30–150)
WBC NRBC COR # BLD AUTO: 4.5 K/UL (ref 4.3–11)

## 2021-10-31 RX ADMIN — DEXTROSE MONOHYDRATE SCH MLS/HR: 50 INJECTION, SOLUTION INTRAVENOUS at 21:04

## 2021-10-31 RX ADMIN — ENOXAPARIN SODIUM SCH MG: 40 INJECTION SUBCUTANEOUS at 21:00

## 2021-10-31 RX ADMIN — PIPERACILLIN SODIUM AND TAZOBACTAM SODIUM SCH MLS/HR: .375; 3 INJECTION, POWDER, LYOPHILIZED, FOR SOLUTION INTRAVENOUS at 17:50

## 2021-10-31 RX ADMIN — LACOSAMIDE SCH MG: 50 TABLET, FILM COATED ORAL at 21:01

## 2021-10-31 RX ADMIN — DIVALPROEX SODIUM SCH MG: 500 TABLET, DELAYED RELEASE ORAL at 10:26

## 2021-10-31 RX ADMIN — VITAMIN D, TAB 1000IU (100/BT) SCH UNIT: 25 TAB at 10:26

## 2021-10-31 RX ADMIN — DEXTROSE MONOHYDRATE SCH MLS/HR: 50 INJECTION, SOLUTION INTRAVENOUS at 14:18

## 2021-10-31 RX ADMIN — LEVETIRACETAM SCH MG: 100 SOLUTION ORAL at 21:01

## 2021-10-31 RX ADMIN — DEXTROSE MONOHYDRATE SCH MLS/HR: 50 INJECTION, SOLUTION INTRAVENOUS at 08:49

## 2021-10-31 RX ADMIN — CARBAMAZEPINE SCH MG: 200 TABLET ORAL at 17:51

## 2021-10-31 RX ADMIN — CARBAMAZEPINE SCH MG: 200 TABLET ORAL at 10:27

## 2021-10-31 RX ADMIN — PIPERACILLIN SODIUM AND TAZOBACTAM SODIUM SCH MLS/HR: .375; 3 INJECTION, POWDER, LYOPHILIZED, FOR SOLUTION INTRAVENOUS at 05:02

## 2021-10-31 RX ADMIN — LEVOTHYROXINE SODIUM SCH MCG: 50 TABLET ORAL at 10:27

## 2021-10-31 RX ADMIN — CARBAMAZEPINE SCH MG: 200 TABLET ORAL at 12:02

## 2021-10-31 RX ADMIN — LACOSAMIDE SCH MG: 50 TABLET, FILM COATED ORAL at 10:27

## 2021-10-31 RX ADMIN — PIPERACILLIN SODIUM AND TAZOBACTAM SODIUM SCH MLS/HR: .375; 3 INJECTION, POWDER, LYOPHILIZED, FOR SOLUTION INTRAVENOUS at 11:52

## 2021-10-31 RX ADMIN — LATANOPROST SCH ML: 50 SOLUTION OPHTHALMIC at 17:51

## 2021-10-31 RX ADMIN — DIVALPROEX SODIUM SCH MG: 500 TABLET, DELAYED RELEASE ORAL at 17:50

## 2021-10-31 RX ADMIN — LEVETIRACETAM SCH MG: 100 SOLUTION ORAL at 10:26

## 2021-10-31 NOTE — NUR
RN NOTES



F/U WITH LAB FOR THE VANCO TROUGH RESULT ; STAFF SAID STILL BRING PROCESSED. AWAITING RESULT 
BEFORE GIVING VANCO AS SCHEDULED (0600). CHARGE RN MADE AWARE. IF RESULT WONT BE RECEIVED 
UNTIL 0700AM WILL ENDORSE TO AM SHIFT FOR FOLLOW THROUGH. CHARGE RN MADE AWARE.  

-------------------------------------------------------------------------------

Addendum: 10/31/21 at 0702 by KIMANI SANTOS RN

-------------------------------------------------------------------------------

@0700 NO RESULT YET FOR THE VANCO TROUGH ; WILL ENDORSE TO AM SHIFT FOR FOLLOW THROUGH AND 
MED ADMINISTRATION.

## 2021-10-31 NOTE — NUR
RN OPENING NOTES

RECEIVED REPORT FROM MORNING NURSE. PATIENT IN BED A/O X2 WITH PERIODS OF CONFUSION. WITH IV 
ACCESS AT LFA G#22 PATENT FLUSHES WELL. HOB ELEVATED, SAFETY MEASURES IN PLACE, CALL LIGHT 
WITHIN REACH. VITAL SIGNS TAKEN AND RECORDED. WILL CONTINUE TO MONITOR PATIENT CLOSELY.

## 2021-10-31 NOTE — NUR
RN NOTES



PATIENT REMAINED TO BE IN NO SIGNS OF ACUTE RESPIRATORY DISTRESS , VITAL SIGNS STABLE AT 
THIS TIME. REGULAR TURNING AND REPOSITIONING DONE.AM PATIENT CARE RENDERED. WILL CONTINUE TO 
MONITOR AND REASSESS FOR ANY CHANGES THROUGHOUT THE SHIFT.

## 2021-10-31 NOTE — NUR
RN CLOSING NOTE: 



PATIENT REMAINS IN ROOM IN NO SIGNS OF RESPIRATORY DISTRESS, PATIENT STILL ON 2L OF 02 VIA 
NC ;TOLERATING WELL SATURATING @ >95% SP02. SAFETY MEASURES IMPLEMENTED, BED IN LOWEST 
POSITION, LOCKED, SIDE RAILS UP, CALL LIGHT WITHIN REACH. ALL NEEDS AND ORDERS ADDRESSED 
DURING THE SHIFT. IV ACCESS MAINTAINED INTACT, SECURED AND FLUSHING WELL.  ALL DUE MEDS 
GIVEN AS ORDERED & SCHEDULED ; PATIENT TOLERATED WELL. PATIENT KEPT CLEAN AND COMFORTABLE 
WITHIN THE SHIFT. WILL ENDORSE TO AM SHIFT TO WAIT FOR THE VANCO TROUGH RESULT BEFORE 
ADMINISTERING VANCO WHICH WAS DUE AT 0600AM DELAYED ADMIN DUE TO RESULT STILL PENDING; AM 
SHIFT CHARGE RN MADE AWARE. PATIENT ENDORSED TO INCOMING SHIFT RN WITH STABLE VITAL SIGN AND 
FOR CONTINUITY OF CARE.

## 2021-11-01 VITALS — DIASTOLIC BLOOD PRESSURE: 88 MMHG | SYSTOLIC BLOOD PRESSURE: 135 MMHG

## 2021-11-01 VITALS — DIASTOLIC BLOOD PRESSURE: 88 MMHG | SYSTOLIC BLOOD PRESSURE: 137 MMHG

## 2021-11-01 VITALS — SYSTOLIC BLOOD PRESSURE: 133 MMHG | DIASTOLIC BLOOD PRESSURE: 77 MMHG

## 2021-11-01 VITALS — SYSTOLIC BLOOD PRESSURE: 135 MMHG | DIASTOLIC BLOOD PRESSURE: 78 MMHG

## 2021-11-01 VITALS — DIASTOLIC BLOOD PRESSURE: 79 MMHG | SYSTOLIC BLOOD PRESSURE: 130 MMHG

## 2021-11-01 LAB
BASOPHILS # BLD AUTO: 0 K/UL (ref 0–0.2)
BASOPHILS NFR BLD AUTO: 0.9 % (ref 0–2)
BUN SERPL-MCNC: 11 MG/DL (ref 7–18)
CALCIUM SERPL-MCNC: 9 MG/DL (ref 8.5–10.1)
CHLORIDE SERPL-SCNC: 98 MMOL/L (ref 98–107)
CO2 SERPL-SCNC: 26 MMOL/L (ref 21–32)
CREAT SERPL-MCNC: 0.7 MG/DL (ref 0.6–1.3)
EOSINOPHIL NFR BLD AUTO: 6 % (ref 0–6)
GLUCOSE SERPL-MCNC: 79 MG/DL (ref 74–106)
HCT VFR BLD AUTO: 40 % (ref 39–51)
HGB BLD-MCNC: 13.5 G/DL (ref 13.5–17.5)
LYMPHOCYTES NFR BLD AUTO: 1.8 K/UL (ref 0.8–4.8)
LYMPHOCYTES NFR BLD AUTO: 34.7 % (ref 20–44)
MAGNESIUM SERPL-MCNC: 2.3 MG/DL (ref 1.8–2.4)
MCHC RBC AUTO-ENTMCNC: 33 G/DL (ref 31–36)
MCV RBC AUTO: 98 FL (ref 80–96)
MONOCYTES NFR BLD AUTO: 0.7 K/UL (ref 0.1–1.3)
MONOCYTES NFR BLD AUTO: 13.8 % (ref 2–12)
NEUTROPHILS # BLD AUTO: 2.3 K/UL (ref 1.8–8.9)
NEUTROPHILS NFR BLD AUTO: 44.6 % (ref 43–81)
PHOSPHATE SERPL-MCNC: 3.6 MG/DL (ref 2.5–4.9)
PLATELET # BLD AUTO: 124 K/UL (ref 150–450)
POTASSIUM SERPL-SCNC: 3.9 MMOL/L (ref 3.5–5.1)
RBC # BLD AUTO: 4.14 MIL/UL (ref 4.5–6)
SODIUM SERPL-SCNC: 134 MMOL/L (ref 136–145)
WBC NRBC COR # BLD AUTO: 5.2 K/UL (ref 4.3–11)

## 2021-11-01 RX ADMIN — DIVALPROEX SODIUM SCH MG: 500 TABLET, DELAYED RELEASE ORAL at 16:37

## 2021-11-01 RX ADMIN — DIVALPROEX SODIUM SCH MG: 500 TABLET, DELAYED RELEASE ORAL at 08:03

## 2021-11-01 RX ADMIN — LEVOTHYROXINE SODIUM SCH MCG: 50 TABLET ORAL at 08:03

## 2021-11-01 RX ADMIN — DEXTROSE MONOHYDRATE SCH MLS/HR: 50 INJECTION, SOLUTION INTRAVENOUS at 06:59

## 2021-11-01 RX ADMIN — LEVETIRACETAM SCH MG: 100 SOLUTION ORAL at 08:04

## 2021-11-01 RX ADMIN — DEXTROSE MONOHYDRATE SCH MLS/HR: 50 INJECTION, SOLUTION INTRAVENOUS at 13:11

## 2021-11-01 RX ADMIN — PIPERACILLIN SODIUM AND TAZOBACTAM SODIUM SCH MLS/HR: .375; 3 INJECTION, POWDER, LYOPHILIZED, FOR SOLUTION INTRAVENOUS at 11:42

## 2021-11-01 RX ADMIN — CARBAMAZEPINE SCH MG: 200 TABLET ORAL at 08:04

## 2021-11-01 RX ADMIN — LACOSAMIDE SCH MG: 50 TABLET, FILM COATED ORAL at 08:04

## 2021-11-01 RX ADMIN — PIPERACILLIN SODIUM AND TAZOBACTAM SODIUM SCH MLS/HR: .375; 3 INJECTION, POWDER, LYOPHILIZED, FOR SOLUTION INTRAVENOUS at 00:55

## 2021-11-01 RX ADMIN — CARBAMAZEPINE SCH MG: 200 TABLET ORAL at 13:06

## 2021-11-01 RX ADMIN — CARBAMAZEPINE SCH MG: 200 TABLET ORAL at 16:36

## 2021-11-01 RX ADMIN — VITAMIN D, TAB 1000IU (100/BT) SCH UNIT: 25 TAB at 08:04

## 2021-11-01 RX ADMIN — PIPERACILLIN SODIUM AND TAZOBACTAM SODIUM SCH MLS/HR: .375; 3 INJECTION, POWDER, LYOPHILIZED, FOR SOLUTION INTRAVENOUS at 06:17

## 2021-11-01 NOTE — NUR
RN NOTES



NO NOTABLE CHANGES DURING SHIFT. PT TO BE DISCHARGED TO [facility] WITH IV ABX X 2 
DAYS. GAVE REPORT TO RADHIKA.

## 2021-11-01 NOTE — NUR
TELE RN NOTE

PT KEPT ON GETTING OUT OF BED AND BECOMING FALL RISK. PT IS CONFUSED. UNABLE TO FOLLOW 
SAFETY PRECAUTIONS. WENDY JERNIGAN DNP INFORMED AND RECEIVED NEW ORDER FOR RESTRAINTS SOFT 
BILATERAL WRIST, ORDER NOTED AND  CARRIED OUT.

## 2021-11-01 NOTE — NUR
RN NOTES



AMBULANCE ARRIVED FOR . IV LEFT IN PLACE PER MD ORDER. BELONGINGS ACCOUNTED FOR. PT 
IN STABLE CONDITION.

## 2021-11-01 NOTE — NUR
TELE RN NOTES



RECEIVED PT ASLEEP IN BED WITH SOFT WRIST RESTRAINTS ON. PT HAS A L ARM 22G IV RUNNING 
VANCOMYCIN @250ML/HR. SAFETY MEASURES IN PLACE WITH BED IN LOWEST LOCKED POSITION, CALL 
LIGHT WITHIN REACH AND BED ALARM ON.

## 2022-08-31 ENCOUNTER — HOSPITAL ENCOUNTER (EMERGENCY)
Dept: HOSPITAL 54 - ER | Age: 67
Discharge: NURSING, REHAB OR LONG-TERM CARE | End: 2022-08-31
Payer: MEDICARE

## 2022-08-31 VITALS — DIASTOLIC BLOOD PRESSURE: 85 MMHG | SYSTOLIC BLOOD PRESSURE: 124 MMHG

## 2022-08-31 VITALS — WEIGHT: 146.31 LBS | HEIGHT: 75 IN | BODY MASS INDEX: 18.19 KG/M2

## 2022-08-31 DIAGNOSIS — R63.0: ICD-10-CM

## 2022-08-31 DIAGNOSIS — H40.9: ICD-10-CM

## 2022-08-31 DIAGNOSIS — D64.9: ICD-10-CM

## 2022-08-31 DIAGNOSIS — G40.909: ICD-10-CM

## 2022-08-31 DIAGNOSIS — R53.83: Primary | ICD-10-CM

## 2022-08-31 DIAGNOSIS — F79: ICD-10-CM

## 2022-08-31 DIAGNOSIS — Z79.82: ICD-10-CM

## 2022-08-31 DIAGNOSIS — Z79.899: ICD-10-CM

## 2022-08-31 DIAGNOSIS — F84.0: ICD-10-CM

## 2022-08-31 DIAGNOSIS — Z20.822: ICD-10-CM

## 2022-08-31 DIAGNOSIS — D69.6: ICD-10-CM

## 2022-08-31 DIAGNOSIS — R94.31: ICD-10-CM

## 2022-08-31 LAB
ALBUMIN SERPL BCP-MCNC: 3.7 G/DL (ref 3.4–5)
ALP SERPL-CCNC: 52 U/L (ref 46–116)
ALT SERPL W P-5'-P-CCNC: 24 U/L (ref 12–78)
AST SERPL W P-5'-P-CCNC: 18 U/L (ref 15–37)
BASOPHILS # BLD AUTO: 0 K/UL (ref 0–0.2)
BASOPHILS NFR BLD AUTO: 0.4 % (ref 0–2)
BILIRUB DIRECT SERPL-MCNC: 0.2 MG/DL (ref 0–0.2)
BILIRUB SERPL-MCNC: 0.5 MG/DL (ref 0.2–1)
BILIRUB UR QL STRIP: NEGATIVE
BUN SERPL-MCNC: 18 MG/DL (ref 7–18)
CALCIUM SERPL-MCNC: 8.9 MG/DL (ref 8.5–10.1)
CHLORIDE SERPL-SCNC: 102 MMOL/L (ref 98–107)
CO2 SERPL-SCNC: 32 MMOL/L (ref 21–32)
COLOR UR: YELLOW
CREAT SERPL-MCNC: 0.9 MG/DL (ref 0.6–1.3)
EOSINOPHIL NFR BLD AUTO: 6 % (ref 0–6)
GLUCOSE SERPL-MCNC: 114 MG/DL (ref 74–106)
GLUCOSE UR STRIP-MCNC: NEGATIVE MG/DL
HCT VFR BLD AUTO: 36 % (ref 39–51)
HGB BLD-MCNC: 12.3 G/DL (ref 13.5–17.5)
LEUKOCYTE ESTERASE UR QL STRIP: NEGATIVE
LYMPHOCYTES NFR BLD AUTO: 1.6 K/UL (ref 0.8–4.8)
LYMPHOCYTES NFR BLD AUTO: 46.8 % (ref 20–44)
MCHC RBC AUTO-ENTMCNC: 34 G/DL (ref 31–36)
MCV RBC AUTO: 95 FL (ref 80–96)
MONOCYTES NFR BLD AUTO: 0.3 K/UL (ref 0.1–1.3)
MONOCYTES NFR BLD AUTO: 9.5 % (ref 2–12)
NEUTROPHILS # BLD AUTO: 1.3 K/UL (ref 1.8–8.9)
NEUTROPHILS NFR BLD AUTO: 37.3 % (ref 43–81)
NITRITE UR QL STRIP: NEGATIVE
PH UR STRIP: 7 [PH] (ref 5–8)
PLATELET # BLD AUTO: 138 K/UL (ref 150–450)
POTASSIUM SERPL-SCNC: 4 MMOL/L (ref 3.5–5.1)
PROT SERPL-MCNC: 6.9 G/DL (ref 6.4–8.2)
PROT UR QL STRIP: NEGATIVE MG/DL
RBC # BLD AUTO: 3.83 MIL/UL (ref 4.5–6)
RBC #/AREA URNS HPF: (no result) /HPF (ref 0–2)
SODIUM SERPL-SCNC: 139 MMOL/L (ref 136–145)
UROBILINOGEN UR STRIP-MCNC: 0.2 EU/DL
WBC #/AREA URNS HPF: (no result) /HPF (ref 0–3)
WBC NRBC COR # BLD AUTO: 3.5 K/UL (ref 4.3–11)

## 2022-08-31 PROCEDURE — 83880 ASSAY OF NATRIURETIC PEPTIDE: CPT

## 2022-08-31 PROCEDURE — 81001 URINALYSIS AUTO W/SCOPE: CPT

## 2022-08-31 PROCEDURE — 71045 X-RAY EXAM CHEST 1 VIEW: CPT

## 2022-08-31 PROCEDURE — 87426 SARSCOV CORONAVIRUS AG IA: CPT

## 2022-08-31 PROCEDURE — 80048 BASIC METABOLIC PNL TOTAL CA: CPT

## 2022-08-31 PROCEDURE — 84484 ASSAY OF TROPONIN QUANT: CPT

## 2022-08-31 PROCEDURE — 36415 COLL VENOUS BLD VENIPUNCTURE: CPT

## 2022-08-31 PROCEDURE — 96360 HYDRATION IV INFUSION INIT: CPT

## 2022-08-31 PROCEDURE — 93005 ELECTROCARDIOGRAM TRACING: CPT

## 2022-08-31 PROCEDURE — C9803 HOPD COVID-19 SPEC COLLECT: HCPCS

## 2022-08-31 PROCEDURE — 85025 COMPLETE CBC W/AUTO DIFF WBC: CPT

## 2022-08-31 PROCEDURE — 80076 HEPATIC FUNCTION PANEL: CPT

## 2022-08-31 PROCEDURE — 99285 EMERGENCY DEPT VISIT HI MDM: CPT

## 2022-08-31 PROCEDURE — 82962 GLUCOSE BLOOD TEST: CPT

## 2022-08-31 NOTE — NUR
Patient discharged to group home in stable condition accompanied by 2 EMTs. 
Written and verbal after care instructions given.

## 2022-08-31 NOTE — NUR
[id number] FOR POOR PO INTAKE AND GEN BODY WEAKNESS. THE PATIENT IS ALERT AND 
ORIENTED X3. DENIES PAIN. IN ROOM AIR AND DENIES SOB. RESPIRATION REGULAR AND 
UNLABORED. THE PATIENT IS ATTACHED TO THE MONITOR. WARM BLANKET PROVIDED FOR 
COMFORT. WILL CONTINUE TO MONITOR THE PATIENT.

## 2022-08-31 NOTE — NUR
[id number] FOR REPORT. SPOKE W/ NITESH, SUPERVISOR, AND INFORMED ABT PT GOING 
BACK TO THEIR FACILITY.

## 2023-04-21 ENCOUNTER — HOSPITAL ENCOUNTER (EMERGENCY)
Dept: HOSPITAL 54 - ER | Age: 68
LOS: 1 days | Discharge: HOME | End: 2023-04-22
Payer: MEDICARE

## 2023-04-21 VITALS — HEIGHT: 73 IN | BODY MASS INDEX: 21.35 KG/M2 | WEIGHT: 161.12 LBS

## 2023-04-21 DIAGNOSIS — G40.909: Primary | ICD-10-CM

## 2023-04-21 DIAGNOSIS — Z79.82: ICD-10-CM

## 2023-04-21 DIAGNOSIS — Z79.899: ICD-10-CM

## 2023-04-21 LAB
ALBUMIN SERPL BCP-MCNC: 3.5 G/DL (ref 3.4–5)
ALP SERPL-CCNC: 56 U/L (ref 46–116)
ALT SERPL W P-5'-P-CCNC: 23 U/L (ref 12–78)
AST SERPL W P-5'-P-CCNC: 17 U/L (ref 15–37)
BASOPHILS # BLD AUTO: 0 K/UL (ref 0–0.2)
BASOPHILS NFR BLD AUTO: 1 % (ref 0–2)
BILIRUB DIRECT SERPL-MCNC: 0.1 MG/DL (ref 0–0.2)
BILIRUB SERPL-MCNC: 0.2 MG/DL (ref 0.2–1)
BUN SERPL-MCNC: 12 MG/DL (ref 7–18)
CALCIUM SERPL-MCNC: 8.7 MG/DL (ref 8.5–10.1)
CHLORIDE SERPL-SCNC: 99 MMOL/L (ref 98–107)
CO2 SERPL-SCNC: 26 MMOL/L (ref 21–32)
CREAT SERPL-MCNC: 0.7 MG/DL (ref 0.6–1.3)
EOSINOPHIL NFR BLD AUTO: 2.9 % (ref 0–6)
ETHANOL SERPL-MCNC: < 3 MG/DL (ref 0–0)
GLUCOSE SERPL-MCNC: 103 MG/DL (ref 74–106)
HCT VFR BLD AUTO: 33 % (ref 39–51)
HGB BLD-MCNC: 11.4 G/DL (ref 13.5–17.5)
LYMPHOCYTES NFR BLD AUTO: 1.9 K/UL (ref 0.8–4.8)
LYMPHOCYTES NFR BLD AUTO: 49.6 % (ref 20–44)
MCHC RBC AUTO-ENTMCNC: 34 G/DL (ref 31–36)
MCV RBC AUTO: 96 FL (ref 80–96)
MONOCYTES NFR BLD AUTO: 0.5 K/UL (ref 0.1–1.3)
MONOCYTES NFR BLD AUTO: 12.2 % (ref 2–12)
NEUTROPHILS # BLD AUTO: 1.3 K/UL (ref 1.8–8.9)
NEUTROPHILS NFR BLD AUTO: 34.3 % (ref 43–81)
PLATELET # BLD AUTO: 152 K/UL (ref 150–450)
POTASSIUM SERPL-SCNC: 4 MMOL/L (ref 3.5–5.1)
PROT SERPL-MCNC: 6.1 G/DL (ref 6.4–8.2)
RBC # BLD AUTO: 3.46 MIL/UL (ref 4.5–6)
SODIUM SERPL-SCNC: 134 MMOL/L (ref 136–145)
WBC NRBC COR # BLD AUTO: 3.9 K/UL (ref 4.3–11)

## 2023-04-21 PROCEDURE — G0480 DRUG TEST DEF 1-7 CLASSES: HCPCS

## 2023-04-21 PROCEDURE — 93005 ELECTROCARDIOGRAM TRACING: CPT

## 2023-04-21 PROCEDURE — 85730 THROMBOPLASTIN TIME PARTIAL: CPT

## 2023-04-21 PROCEDURE — 71045 X-RAY EXAM CHEST 1 VIEW: CPT

## 2023-04-21 PROCEDURE — 80320 DRUG SCREEN QUANTALCOHOLS: CPT

## 2023-04-21 PROCEDURE — 70450 CT HEAD/BRAIN W/O DYE: CPT

## 2023-04-21 PROCEDURE — 80307 DRUG TEST PRSMV CHEM ANLYZR: CPT

## 2023-04-21 PROCEDURE — 96360 HYDRATION IV INFUSION INIT: CPT

## 2023-04-21 PROCEDURE — 36415 COLL VENOUS BLD VENIPUNCTURE: CPT

## 2023-04-21 PROCEDURE — 99285 EMERGENCY DEPT VISIT HI MDM: CPT

## 2023-04-21 PROCEDURE — 83605 ASSAY OF LACTIC ACID: CPT

## 2023-04-21 PROCEDURE — 85025 COMPLETE CBC W/AUTO DIFF WBC: CPT

## 2023-04-21 PROCEDURE — 80048 BASIC METABOLIC PNL TOTAL CA: CPT

## 2023-04-21 PROCEDURE — 80076 HEPATIC FUNCTION PANEL: CPT

## 2023-04-21 NOTE — NUR
[id number] FROM [facility] B[facility], POSS SEIZURE, CPR DONE AT FACILITY. CAME AWAKE, 
+DEVELOPMENTAL DELAY, HX OF SEIZURE. PATIENT IS AWAKE, ABLE TO AMBULATE. 
CONVERSANT BUT ANSWERS SIMPLE QUESTIONS ONLY. PLACED COMFORTABLY IN BED. 
ATTACHED TO MONITOR. VITALS CHECKED.

## 2023-04-21 NOTE — NUR
STTRAIGHT CATH WITH NO URINE RETURN. RECIEVED PT WITH HIS PANTS SOAKED. PT 
REQUESTED URINAL INSTEAD OF STRAIGHT CATH

## 2023-04-22 VITALS — SYSTOLIC BLOOD PRESSURE: 127 MMHG | DIASTOLIC BLOOD PRESSURE: 70 MMHG
